# Patient Record
Sex: FEMALE | Race: WHITE | NOT HISPANIC OR LATINO | Employment: OTHER | URBAN - NONMETROPOLITAN AREA
[De-identification: names, ages, dates, MRNs, and addresses within clinical notes are randomized per-mention and may not be internally consistent; named-entity substitution may affect disease eponyms.]

---

## 2017-04-05 ENCOUNTER — TRANSFERRED RECORDS (OUTPATIENT)
Dept: HEALTH INFORMATION MANAGEMENT | Facility: HOSPITAL | Age: 72
End: 2017-04-05

## 2017-04-05 DIAGNOSIS — Z78.0 POSTMENOPAUSAL STATUS: Primary | ICD-10-CM

## 2017-04-19 ENCOUNTER — HOSPITAL ENCOUNTER (OUTPATIENT)
Dept: GENERAL RADIOLOGY | Facility: HOSPITAL | Age: 72
Discharge: HOME | End: 2017-04-19
Attending: FAMILY MEDICINE | Admitting: FAMILY MEDICINE
Payer: COMMERCIAL

## 2017-04-19 PROCEDURE — 77080 DXA BONE DENSITY AXIAL: CPT | Mod: TC

## 2020-06-25 ENCOUNTER — MEDICAL CORRESPONDENCE (OUTPATIENT)
Dept: HEALTH INFORMATION MANAGEMENT | Facility: HOSPITAL | Age: 75
End: 2020-06-25

## 2020-06-27 ENCOUNTER — HOSPITAL ENCOUNTER (EMERGENCY)
Facility: HOSPITAL | Age: 75
Discharge: HOME | End: 2020-06-27
Attending: EMERGENCY MEDICINE | Admitting: EMERGENCY MEDICINE
Payer: COMMERCIAL

## 2020-06-27 ENCOUNTER — APPOINTMENT (OUTPATIENT)
Dept: GENERAL RADIOLOGY | Facility: HOSPITAL | Age: 75
End: 2020-06-27
Attending: EMERGENCY MEDICINE
Payer: COMMERCIAL

## 2020-06-27 VITALS
SYSTOLIC BLOOD PRESSURE: 155 MMHG | RESPIRATION RATE: 16 BRPM | OXYGEN SATURATION: 95 % | DIASTOLIC BLOOD PRESSURE: 95 MMHG | TEMPERATURE: 99.3 F | HEART RATE: 77 BPM

## 2020-06-27 DIAGNOSIS — N39.0 URINARY TRACT INFECTION WITHOUT HEMATURIA, SITE UNSPECIFIED: ICD-10-CM

## 2020-06-27 DIAGNOSIS — R50.9 FEVER, UNSPECIFIED FEVER CAUSE: ICD-10-CM

## 2020-06-27 LAB
ALBUMIN UR-MCNC: NEGATIVE MG/DL
ANION GAP SERPL CALCULATED.3IONS-SCNC: 5 MMOL/L (ref 3–14)
APPEARANCE UR: CLEAR
BACTERIA #/AREA URNS HPF: ABNORMAL /HPF
BASOPHILS # BLD AUTO: 0.1 10E9/L (ref 0–0.2)
BASOPHILS NFR BLD AUTO: 1.1 %
BILIRUB UR QL STRIP: NEGATIVE
BUN SERPL-MCNC: 11 MG/DL (ref 7–30)
CALCIUM SERPL-MCNC: 9.1 MG/DL (ref 8.5–10.1)
CHLORIDE SERPL-SCNC: 102 MMOL/L (ref 94–109)
CO2 SERPL-SCNC: 26 MMOL/L (ref 20–32)
COLOR UR AUTO: ABNORMAL
CREAT SERPL-MCNC: 0.76 MG/DL (ref 0.52–1.04)
D DIMER PPP FEU-MCNC: <0.3 UG/ML FEU (ref 0–0.5)
DIFFERENTIAL METHOD BLD: NORMAL
EOSINOPHIL # BLD AUTO: 0 10E9/L (ref 0–0.7)
EOSINOPHIL NFR BLD AUTO: 0 %
ERYTHROCYTE [DISTWIDTH] IN BLOOD BY AUTOMATED COUNT: 12.5 % (ref 10–15)
ERYTHROCYTE [SEDIMENTATION RATE] IN BLOOD BY WESTERGREN METHOD: 9 MM/H (ref 0–30)
FLUAV+FLUBV RNA SPEC QL NAA+PROBE: NEGATIVE
FLUAV+FLUBV RNA SPEC QL NAA+PROBE: NEGATIVE
GFR SERPL CREATININE-BSD FRML MDRD: 76 ML/MIN/{1.73_M2}
GLUCOSE SERPL-MCNC: 100 MG/DL (ref 70–99)
GLUCOSE UR STRIP-MCNC: NEGATIVE MG/DL
HCT VFR BLD AUTO: 37.8 % (ref 35–47)
HGB BLD-MCNC: 13 G/DL (ref 11.7–15.7)
HGB UR QL STRIP: NEGATIVE
IMM GRANULOCYTES # BLD: 0 10E9/L (ref 0–0.4)
IMM GRANULOCYTES NFR BLD: 0.5 %
INR PPP: 1.02 (ref 0.86–1.14)
KETONES UR STRIP-MCNC: NEGATIVE MG/DL
LEUKOCYTE ESTERASE UR QL STRIP: ABNORMAL
LYMPHOCYTES # BLD AUTO: 0.9 10E9/L (ref 0.8–5.3)
LYMPHOCYTES NFR BLD AUTO: 14.1 %
MCH RBC QN AUTO: 31.8 PG (ref 26.5–33)
MCHC RBC AUTO-ENTMCNC: 34.4 G/DL (ref 31.5–36.5)
MCV RBC AUTO: 92 FL (ref 78–100)
MONOCYTES # BLD AUTO: 0.6 10E9/L (ref 0–1.3)
MONOCYTES NFR BLD AUTO: 10.5 %
NEUTROPHILS # BLD AUTO: 4.5 10E9/L (ref 1.6–8.3)
NEUTROPHILS NFR BLD AUTO: 73.8 %
NITRATE UR QL: NEGATIVE
NRBC # BLD AUTO: 0 10*3/UL
NRBC BLD AUTO-RTO: 0 /100
PH UR STRIP: 7.5 PH (ref 4.7–8)
PLATELET # BLD AUTO: 176 10E9/L (ref 150–450)
POTASSIUM SERPL-SCNC: 3.9 MMOL/L (ref 3.4–5.3)
RBC # BLD AUTO: 4.09 10E12/L (ref 3.8–5.2)
RBC #/AREA URNS AUTO: 2 /HPF (ref 0–2)
RSV RNA SPEC NAA+PROBE: NEGATIVE
SODIUM SERPL-SCNC: 133 MMOL/L (ref 133–144)
SOURCE: ABNORMAL
SP GR UR STRIP: 1.01 (ref 1–1.03)
SPECIMEN SOURCE: NORMAL
SQUAMOUS #/AREA URNS AUTO: 1 /HPF (ref 0–1)
TROPONIN I SERPL-MCNC: <0.015 UG/L (ref 0–0.04)
UROBILINOGEN UR STRIP-MCNC: NORMAL MG/DL (ref 0–2)
WBC # BLD AUTO: 6.1 10E9/L (ref 4–11)
WBC #/AREA URNS AUTO: 8 /HPF (ref 0–5)

## 2020-06-27 PROCEDURE — 85379 FIBRIN DEGRADATION QUANT: CPT | Performed by: EMERGENCY MEDICINE

## 2020-06-27 PROCEDURE — U0003 INFECTIOUS AGENT DETECTION BY NUCLEIC ACID (DNA OR RNA); SEVERE ACUTE RESPIRATORY SYNDROME CORONAVIRUS 2 (SARS-COV-2) (CORONAVIRUS DISEASE [COVID-19]), AMPLIFIED PROBE TECHNIQUE, MAKING USE OF HIGH THROUGHPUT TECHNOLOGIES AS DESCRIBED BY CMS-2020-01-R: HCPCS | Performed by: EMERGENCY MEDICINE

## 2020-06-27 PROCEDURE — 71045 X-RAY EXAM CHEST 1 VIEW: CPT | Mod: TC

## 2020-06-27 PROCEDURE — 84484 ASSAY OF TROPONIN QUANT: CPT | Performed by: EMERGENCY MEDICINE

## 2020-06-27 PROCEDURE — 36415 COLL VENOUS BLD VENIPUNCTURE: CPT | Performed by: EMERGENCY MEDICINE

## 2020-06-27 PROCEDURE — 25000132 ZZH RX MED GY IP 250 OP 250 PS 637: Performed by: EMERGENCY MEDICINE

## 2020-06-27 PROCEDURE — 86618 LYME DISEASE ANTIBODY: CPT | Performed by: EMERGENCY MEDICINE

## 2020-06-27 PROCEDURE — 86617 LYME DISEASE ANTIBODY: CPT | Performed by: EMERGENCY MEDICINE

## 2020-06-27 PROCEDURE — 87086 URINE CULTURE/COLONY COUNT: CPT | Performed by: EMERGENCY MEDICINE

## 2020-06-27 PROCEDURE — 86617 LYME DISEASE ANTIBODY: CPT | Mod: 59 | Performed by: EMERGENCY MEDICINE

## 2020-06-27 PROCEDURE — 85610 PROTHROMBIN TIME: CPT

## 2020-06-27 PROCEDURE — C9803 HOPD COVID-19 SPEC COLLECT: HCPCS

## 2020-06-27 PROCEDURE — 87040 BLOOD CULTURE FOR BACTERIA: CPT | Performed by: EMERGENCY MEDICINE

## 2020-06-27 PROCEDURE — 85025 COMPLETE CBC W/AUTO DIFF WBC: CPT | Performed by: EMERGENCY MEDICINE

## 2020-06-27 PROCEDURE — 81001 URINALYSIS AUTO W/SCOPE: CPT | Performed by: EMERGENCY MEDICINE

## 2020-06-27 PROCEDURE — 99284 EMERGENCY DEPT VISIT MOD MDM: CPT | Mod: Z6 | Performed by: EMERGENCY MEDICINE

## 2020-06-27 PROCEDURE — 99284 EMERGENCY DEPT VISIT MOD MDM: CPT | Mod: 25

## 2020-06-27 PROCEDURE — 85652 RBC SED RATE AUTOMATED: CPT | Performed by: EMERGENCY MEDICINE

## 2020-06-27 PROCEDURE — 87631 RESP VIRUS 3-5 TARGETS: CPT | Performed by: EMERGENCY MEDICINE

## 2020-06-27 PROCEDURE — 80048 BASIC METABOLIC PNL TOTAL CA: CPT | Performed by: EMERGENCY MEDICINE

## 2020-06-27 RX ORDER — DOXYCYCLINE 100 MG/1
100 TABLET ORAL DAILY
Qty: 10 TABLET | Refills: 0 | Status: SHIPPED | OUTPATIENT
Start: 2020-06-27 | End: 2020-07-07

## 2020-06-27 RX ORDER — ACETAMINOPHEN 325 MG/1
650 TABLET ORAL ONCE
Status: COMPLETED | OUTPATIENT
Start: 2020-06-27 | End: 2020-06-27

## 2020-06-27 RX ADMIN — ACETAMINOPHEN 650 MG: 325 TABLET, FILM COATED ORAL at 15:21

## 2020-06-27 NOTE — ED AVS SNAPSHOT
[facility]  [address]  [place] [address]  Phone:  [phone]                                    Lorraine Arredondo   MRN: [id number]    Department:  [facility]   Date of Visit:  6/27/2020           After Visit Summary Signature Page    I have received my discharge instructions, and my questions have been answered. I have discussed any challenges I see with this plan with the nurse or doctor.    ..........................................................................................................................................  Patient/Patient Representative Signature      ..........................................................................................................................................  Patient Representative Print Name and Relationship to Patient    ..................................................               ................................................  Date                                   Time    ..........................................................................................................................................  Reviewed by Signature/Title    ...................................................              ..............................................  Date                                               Time          22EPIC Rev 08/18

## 2020-06-27 NOTE — ED PROVIDER NOTES
History     Chief Complaint   Patient presents with     Fever     HPI  Lorraine Arredondo is a 75 year old female who presents today with complaints of a fever.  Symptoms began yesterday.  Symptoms were sudden onset.  Denies any chills.  Patient complained of myalgias.  Has been at baseline state of health.  No additional complaints.  Recent history of tick bite.    Allergies:  Allergies   Allergen Reactions     Cats Cough     Sneezing     Fosamax [Alendronic Acid] GI Disturbance     Gluten Meal Diarrhea and GI Disturbance     Lactose GI Disturbance     Nickel Itching     No Clinical Screening - See Comments GI Disturbance     Barley, oats     Wheat Extract GI Disturbance       Problem List:    There are no active problems to display for this patient.       Past Medical History:    No past medical history on file.    Past Surgical History:    No past surgical history on file.    Family History:    No family history on file.    Social History:  Marital Status:   [2]  Social History     Tobacco Use     Smoking status: Not on file   Substance Use Topics     Alcohol use: Not on file     Drug use: Not on file        Medications:    No current outpatient medications on file.        Review of Systems   Constitutional: Positive for fever. Negative for chills.   HENT: Negative.    Eyes: Negative.  Negative for photophobia.   Respiratory: Negative.  Negative for shortness of breath.    Cardiovascular: Negative.    Gastrointestinal: Negative.    Endocrine: Negative.    Genitourinary: Negative.  Negative for urgency.   Musculoskeletal: Negative.    Skin: Negative.    Allergic/Immunologic: Negative.    Neurological: Negative.    Hematological: Negative.    Psychiatric/Behavioral: Negative.  Negative for self-injury, sleep disturbance and suicidal ideas. The patient is not nervous/anxious.        Physical Exam   BP: 161/89  Pulse: 83  Temp: (!) 102.1  F (38.9  C)(last ibuprofen was last night.)  Resp: 20  SpO2: 98  %      Physical Exam  Constitutional:       General: She is not in acute distress.     Appearance: Normal appearance. She is normal weight. She is not toxic-appearing.   HENT:      Head: Normocephalic and atraumatic.   Eyes:      Extraocular Movements: Extraocular movements intact.      Conjunctiva/sclera: Conjunctivae normal.   Neck:      Musculoskeletal: Normal range of motion and neck supple. No neck rigidity.   Cardiovascular:      Rate and Rhythm: Normal rate and regular rhythm.      Pulses: Normal pulses.   Pulmonary:      Effort: Pulmonary effort is normal.   Abdominal:      General: Abdomen is flat.   Musculoskeletal: Normal range of motion.   Lymphadenopathy:      Cervical: No cervical adenopathy.   Skin:     General: Skin is warm.      Capillary Refill: Capillary refill takes less than 2 seconds.   Neurological:      General: No focal deficit present.      Mental Status: She is alert and oriented to person, place, and time.   Psychiatric:         Mood and Affect: Mood normal.         Behavior: Behavior normal.         Thought Content: Thought content normal.         Judgment: Judgment normal.         ED Course        Procedures      Results for orders placed or performed during the hospital encounter of 06/27/20 (from the past 24 hour(s))   Basic metabolic panel   Result Value Ref Range    Sodium 133 133 - 144 mmol/L    Potassium 3.9 3.4 - 5.3 mmol/L    Chloride 102 94 - 109 mmol/L    Carbon Dioxide 26 20 - 32 mmol/L    Anion Gap 5 3 - 14 mmol/L    Glucose 100 (H) 70 - 99 mg/dL    Urea Nitrogen 11 7 - 30 mg/dL    Creatinine 0.76 0.52 - 1.04 mg/dL    GFR Estimate 76 >60 mL/min/[1.73_m2]    GFR Estimate If Black 89 >60 mL/min/[1.73_m2]    Calcium 9.1 8.5 - 10.1 mg/dL   CBC with platelets differential   Result Value Ref Range    WBC 6.1 4.0 - 11.0 10e9/L    RBC Count 4.09 3.8 - 5.2 10e12/L    Hemoglobin 13.0 11.7 - 15.7 g/dL    Hematocrit 37.8 35.0 - 47.0 %    MCV 92 78 - 100 fl    MCH 31.8 26.5 - 33.0 pg     MCHC 34.4 31.5 - 36.5 g/dL    RDW 12.5 10.0 - 15.0 %    Platelet Count 176 150 - 450 10e9/L    Diff Method Automated Method     % Neutrophils 73.8 %    % Lymphocytes 14.1 %    % Monocytes 10.5 %    % Eosinophils 0.0 %    % Basophils 1.1 %    % Immature Granulocytes 0.5 %    Nucleated RBCs 0 0 /100    Absolute Neutrophil 4.5 1.6 - 8.3 10e9/L    Absolute Lymphocytes 0.9 0.8 - 5.3 10e9/L    Absolute Monocytes 0.6 0.0 - 1.3 10e9/L    Absolute Eosinophils 0.0 0.0 - 0.7 10e9/L    Absolute Basophils 0.1 0.0 - 0.2 10e9/L    Abs Immature Granulocytes 0.0 0 - 0.4 10e9/L    Absolute Nucleated RBC 0.0    D dimer quantitative   Result Value Ref Range    D Dimer <0.3 0.0 - 0.50 ug/ml FEU   INR   Result Value Ref Range    INR 1.02 0.86 - 1.14   Troponin I   Result Value Ref Range    Troponin I ES <0.015 0.000 - 0.045 ug/L   Erythrocyte sedimentation rate auto   Result Value Ref Range    Sed Rate 9 0 - 30 mm/h   XR Chest Port 1 View    Narrative    PROCEDURE:  XR CHEST PORT 1 VW    HISTORY: Patient with complaints of fever myalgias rule out pneumonia.  .    COMPARISON:  None.    FINDINGS:    The cardiomediastinal contours are stable. There is calcific aortic  atherosclerosis.   No focal consolidation, effusion or pneumothorax.      Impression    IMPRESSION:  No focal consolidation.      JESUSITA NATION MD   UA with Microscopic   Result Value Ref Range    Color Urine Light Yellow     Appearance Urine Clear     Glucose Urine Negative NEG^Negative mg/dL    Bilirubin Urine Negative NEG^Negative    Ketones Urine Negative NEG^Negative mg/dL    Specific Gravity Urine 1.008 1.003 - 1.035    Blood Urine Negative NEG^Negative    pH Urine 7.5 4.7 - 8.0 pH    Protein Albumin Urine Negative NEG^Negative mg/dL    Urobilinogen mg/dL Normal 0.0 - 2.0 mg/dL    Nitrite Urine Negative NEG^Negative    Leukocyte Esterase Urine Large (A) NEG^Negative    Source Midstream Urine     WBC Urine 8 (H) 0 - 5 /HPF    RBC Urine 2 0 - 2 /HPF    Bacteria Urine  None (A) NEG^Negative /HPF    Squamous Epithelial /HPF Urine 1 0 - 1 /HPF       Medications   acetaminophen (TYLENOL) tablet 650 mg (has no administration in time range)       Assessments & Plan (with Medical Decision Making)     Well appearing 75-year-old female who presents today with complaints of a fever.  Patient denies any URI symptoms.  Patient also complained of myalgias.  No recent travel outside the country.  Patient reports she had a tick bite recently that she removed.  Patient unsure how long the tick had been present.    Physical exam unremarkable.  Labs as noted as above with suggestion of a possible urinary tract infection.  Influenza pending at this time.  Patient not willing to wait any further for results of tests including call with test.  Patient is going to be discharged home and states she will call back for her influenza test results.     Given recent tick bite, Lyme antibody test ordered.  Patient will be treated with doxycycline x 10 days for possible UTI and early stage of Lyme's.  Patient understands to return if she develops any new or worsening symptoms. Blood cx pending.     Due to the nature of this electronic medical record, laboratory results, imaging results, diagnosis, other information and medications reported above may not represent information available to me at the the time of my care and disposition. Medications reported above may have not been ordered by me.     Portions of the record may have been created with voice recognition software. Occasional wrong-word or 'sound-a- like' substitution may have occurred due to the inherent limitations of voice recognition software. Though the chart has been reviewed, there may be inadvertent transcription errors. Read the chart carefully and recognize, using context, where substitutions have occurred.       New Prescriptions    No medications on file       Final diagnoses:   Fever, unspecified fever cause   Urinary tract infection  without hematuria, site unspecified       6/27/2020   [facility]     Ham Joe MD  06/27/20 8022       Ham Joe MD  06/30/20 8673

## 2020-06-27 NOTE — DISCHARGE INSTRUCTIONS
1) Follow the aftercare instructions provided  2) You must follow up with your doctor on Monday  3) You have been given a prescription for a medication that can cause an allergic reactions. Return to the ER if you develop any itching, tongue swelling, wheezing or shortness of breath.

## 2020-06-27 NOTE — LETTER
June 30, 2020        Lorraine [place]redondo  [address]   [facility] [place] [address]    This letter provides a written record that you were tested for COVID-19 on 06/27/20.       Your result was negative. This means that we didn t find the virus that causes COVID-19 in your sample. A test may show negative when you do actually have the virus. This can happen when the virus is in the early stages of infection, before you feel illness symptoms.    If you have symptoms   Stay home and away from others (self-isolate) until you meet ALL of the guidelines below:    You ve had no fever--and no medicine that reduces fever--for 3 full days (72 hours). And      Your other symptoms have gotten better. For example, your cough or breathing has improved. And     At least 10 days have passed since your symptoms started.    During this time:    Stay home. Don t go to work, school or anywhere else.     Stay in your own room, including for meals. Use your own bathroom if you can.    Stay away from others in your home. No hugging, kissing or shaking hands. No visitors.    Clean  high touch  surfaces often (doorknobs, counters, handles, etc.). Use a household cleaning spray or wipes. You can find a full list on the EPA website at www.epa.gov/pesticide-registration/list-n-disinfectants-use-against-sars-cov-2.    Cover your mouth and nose with a mask, tissue or washcloth to avoid spreading germs.    Wash your hands and face often with soap and water.    Going back to work  Check with your employer for any guidelines to follow for going back to work.    Employers: This document serves as formal notice that your employee tested negative for COVID-19, as of the testing date shown above.

## 2020-06-27 NOTE — ED TRIAGE NOTES
Pt in for evaluation of fever onset yesterday. Pt reports she was feeling chilled last night and checked and had a temp of 101. States this am upon awakening she had temp of 98 and over the day she has noticed the chills returning and just pta temp 101.3 again. Pt denies n,v,d, cough or sob. Reports she did have a small tick bite to the back of her right arm about 10 days ago.

## 2020-06-29 LAB
BACTERIA SPEC CULT: NORMAL
SARS-COV-2 RNA SPEC QL NAA+PROBE: NOT DETECTED
SPECIMEN SOURCE: NORMAL
SPECIMEN SOURCE: NORMAL

## 2020-06-30 LAB — B BURGDOR IGG+IGM SER QL: 0.1 (ref 0–0.89)

## 2020-06-30 ASSESSMENT — ENCOUNTER SYMPTOMS
RESPIRATORY NEGATIVE: 1
CARDIOVASCULAR NEGATIVE: 1
SHORTNESS OF BREATH: 0
PHOTOPHOBIA: 0
NERVOUS/ANXIOUS: 0
SLEEP DISTURBANCE: 0
ALLERGIC/IMMUNOLOGIC NEGATIVE: 1
GASTROINTESTINAL NEGATIVE: 1
CHILLS: 0
FEVER: 1
NEUROLOGICAL NEGATIVE: 1
PSYCHIATRIC NEGATIVE: 1
MUSCULOSKELETAL NEGATIVE: 1
HEMATOLOGIC/LYMPHATIC NEGATIVE: 1
ENDOCRINE NEGATIVE: 1
EYES NEGATIVE: 1

## 2020-07-01 LAB
B BURGDOR IGG SER QL IB: NEGATIVE
B BURGDOR IGM SER QL IB: NEGATIVE

## 2020-07-03 LAB
BACTERIA SPEC CULT: NORMAL
SPECIMEN SOURCE: NORMAL

## 2020-08-11 ENCOUNTER — HOSPITAL ENCOUNTER (OUTPATIENT)
Dept: BONE DENSITY | Facility: HOSPITAL | Age: 75
Discharge: HOME | End: 2020-08-11
Attending: FAMILY MEDICINE | Admitting: FAMILY MEDICINE
Payer: COMMERCIAL

## 2020-08-11 DIAGNOSIS — M81.0 AGE-RELATED OSTEOPOROSIS WITHOUT CURRENT PATHOLOGICAL FRACTURE: ICD-10-CM

## 2020-08-11 DIAGNOSIS — E28.39 ESTROGEN DEFICIENCY: ICD-10-CM

## 2020-08-11 DIAGNOSIS — Z78.0 POSTMENOPAUSAL: ICD-10-CM

## 2020-08-11 PROCEDURE — 77080 DXA BONE DENSITY AXIAL: CPT | Mod: TC

## 2021-07-01 ENCOUNTER — TRANSFERRED RECORDS (OUTPATIENT)
Dept: HEALTH INFORMATION MANAGEMENT | Facility: CLINIC | Age: 76
End: 2021-07-01

## 2021-07-04 LAB
CREATININE (EXTERNAL): 0.82 MG/DL (ref 0.4–1)
GFR ESTIMATED (EXTERNAL): >60 ML/MIN/1.73M2
GLUCOSE (EXTERNAL): 100 MG/DL (ref 70–99)
POTASSIUM (EXTERNAL): 4.2 MEQ/L (ref 3.4–5.1)

## 2021-07-05 ENCOUNTER — APPOINTMENT (OUTPATIENT)
Dept: GENERAL RADIOLOGY | Facility: HOSPITAL | Age: 76
End: 2021-07-05
Attending: EMERGENCY MEDICINE
Payer: COMMERCIAL

## 2021-07-05 ENCOUNTER — HOSPITAL ENCOUNTER (EMERGENCY)
Facility: HOSPITAL | Age: 76
Discharge: ANOTHER HOSPITAL | End: 2021-07-05
Attending: EMERGENCY MEDICINE | Admitting: EMERGENCY MEDICINE
Payer: COMMERCIAL

## 2021-07-05 ENCOUNTER — TRANSFERRED RECORDS (OUTPATIENT)
Dept: HEALTH INFORMATION MANAGEMENT | Facility: CLINIC | Age: 76
End: 2021-07-05

## 2021-07-05 VITALS
DIASTOLIC BLOOD PRESSURE: 74 MMHG | SYSTOLIC BLOOD PRESSURE: 116 MMHG | OXYGEN SATURATION: 96 % | RESPIRATION RATE: 13 BRPM | HEART RATE: 69 BPM | TEMPERATURE: 98.5 F | WEIGHT: 137 LBS

## 2021-07-05 DIAGNOSIS — I21.4 NSTEMI (NON-ST ELEVATED MYOCARDIAL INFARCTION) (H): ICD-10-CM

## 2021-07-05 LAB
ALBUMIN SERPL-MCNC: 3.8 G/DL (ref 3.4–5)
ALP SERPL-CCNC: 57 U/L (ref 40–150)
ALT SERPL W P-5'-P-CCNC: 24 U/L (ref 0–50)
ANION GAP SERPL CALCULATED.3IONS-SCNC: 5 MMOL/L (ref 3–14)
AST SERPL W P-5'-P-CCNC: 22 U/L (ref 0–45)
BASOPHILS # BLD AUTO: 0.1 10E9/L (ref 0–0.2)
BASOPHILS NFR BLD AUTO: 0.9 %
BILIRUB SERPL-MCNC: 0.9 MG/DL (ref 0.2–1.3)
BUN SERPL-MCNC: 11 MG/DL (ref 7–30)
CALCIUM SERPL-MCNC: 9.1 MG/DL (ref 8.5–10.1)
CHLORIDE SERPL-SCNC: 105 MMOL/L (ref 94–109)
CO2 SERPL-SCNC: 28 MMOL/L (ref 20–32)
CREAT SERPL-MCNC: 0.78 MG/DL (ref 0.52–1.04)
DIFFERENTIAL METHOD BLD: NORMAL
EOSINOPHIL # BLD AUTO: 0 10E9/L (ref 0–0.7)
EOSINOPHIL NFR BLD AUTO: 0.4 %
ERYTHROCYTE [DISTWIDTH] IN BLOOD BY AUTOMATED COUNT: 12.7 % (ref 10–15)
GFR SERPL CREATININE-BSD FRML MDRD: 74 ML/MIN/{1.73_M2}
GLUCOSE SERPL-MCNC: 105 MG/DL (ref 70–99)
HCT VFR BLD AUTO: 37.5 % (ref 35–47)
HGB BLD-MCNC: 12.9 G/DL (ref 11.7–15.7)
IMM GRANULOCYTES # BLD: 0 10E9/L (ref 0–0.4)
IMM GRANULOCYTES NFR BLD: 0.3 %
INR PPP: 1.04 (ref 0.86–1.14)
LABORATORY COMMENT REPORT: NORMAL
LYMPHOCYTES # BLD AUTO: 1.2 10E9/L (ref 0.8–5.3)
LYMPHOCYTES NFR BLD AUTO: 17.3 %
MCH RBC QN AUTO: 32.3 PG (ref 26.5–33)
MCHC RBC AUTO-ENTMCNC: 34.4 G/DL (ref 31.5–36.5)
MCV RBC AUTO: 94 FL (ref 78–100)
MONOCYTES # BLD AUTO: 0.6 10E9/L (ref 0–1.3)
MONOCYTES NFR BLD AUTO: 8.6 %
NEUTROPHILS # BLD AUTO: 5 10E9/L (ref 1.6–8.3)
NEUTROPHILS NFR BLD AUTO: 72.5 %
NRBC # BLD AUTO: 0 10*3/UL
NRBC BLD AUTO-RTO: 0 /100
NT-PROBNP SERPL-MCNC: 1015 PG/ML (ref 0–1800)
PLATELET # BLD AUTO: 238 10E9/L (ref 150–450)
POTASSIUM SERPL-SCNC: 3.9 MMOL/L (ref 3.4–5.3)
PROT SERPL-MCNC: 7.5 G/DL (ref 6.8–8.8)
RBC # BLD AUTO: 4 10E12/L (ref 3.8–5.2)
SARS-COV-2 RNA RESP QL NAA+PROBE: NEGATIVE
SODIUM SERPL-SCNC: 138 MMOL/L (ref 133–144)
SPECIMEN SOURCE: NORMAL
TROPONIN I SERPL-MCNC: 1.02 UG/L (ref 0–0.04)
WBC # BLD AUTO: 6.9 10E9/L (ref 4–11)

## 2021-07-05 PROCEDURE — U0005 INFEC AGEN DETEC AMPLI PROBE: HCPCS | Performed by: EMERGENCY MEDICINE

## 2021-07-05 PROCEDURE — 36415 COLL VENOUS BLD VENIPUNCTURE: CPT

## 2021-07-05 PROCEDURE — 96376 TX/PRO/DX INJ SAME DRUG ADON: CPT

## 2021-07-05 PROCEDURE — 85025 COMPLETE CBC W/AUTO DIFF WBC: CPT | Performed by: EMERGENCY MEDICINE

## 2021-07-05 PROCEDURE — 71045 X-RAY EXAM CHEST 1 VIEW: CPT

## 2021-07-05 PROCEDURE — 93005 ELECTROCARDIOGRAM TRACING: CPT

## 2021-07-05 PROCEDURE — 99291 CRITICAL CARE FIRST HOUR: CPT | Performed by: EMERGENCY MEDICINE

## 2021-07-05 PROCEDURE — C9803 HOPD COVID-19 SPEC COLLECT: HCPCS

## 2021-07-05 PROCEDURE — 85610 PROTHROMBIN TIME: CPT | Performed by: EMERGENCY MEDICINE

## 2021-07-05 PROCEDURE — 84484 ASSAY OF TROPONIN QUANT: CPT | Performed by: EMERGENCY MEDICINE

## 2021-07-05 PROCEDURE — U0003 INFECTIOUS AGENT DETECTION BY NUCLEIC ACID (DNA OR RNA); SEVERE ACUTE RESPIRATORY SYNDROME CORONAVIRUS 2 (SARS-COV-2) (CORONAVIRUS DISEASE [COVID-19]), AMPLIFIED PROBE TECHNIQUE, MAKING USE OF HIGH THROUGHPUT TECHNOLOGIES AS DESCRIBED BY CMS-2020-01-R: HCPCS | Performed by: EMERGENCY MEDICINE

## 2021-07-05 PROCEDURE — 83880 ASSAY OF NATRIURETIC PEPTIDE: CPT | Mod: GZ | Performed by: EMERGENCY MEDICINE

## 2021-07-05 PROCEDURE — 96368 THER/DIAG CONCURRENT INF: CPT

## 2021-07-05 PROCEDURE — 93010 ELECTROCARDIOGRAM REPORT: CPT | Performed by: INTERNAL MEDICINE

## 2021-07-05 PROCEDURE — 99285 EMERGENCY DEPT VISIT HI MDM: CPT | Mod: 25

## 2021-07-05 PROCEDURE — 96365 THER/PROPH/DIAG IV INF INIT: CPT

## 2021-07-05 PROCEDURE — 80053 COMPREHEN METABOLIC PANEL: CPT | Performed by: EMERGENCY MEDICINE

## 2021-07-05 PROCEDURE — 250N000011 HC RX IP 250 OP 636: Performed by: EMERGENCY MEDICINE

## 2021-07-05 RX ORDER — ATORVASTATIN CALCIUM 40 MG/1
40 TABLET, FILM COATED ORAL
COMMUNITY
Start: 2021-08-03

## 2021-07-05 RX ORDER — CLOPIDOGREL BISULFATE 75 MG/1
75 TABLET ORAL
COMMUNITY
Start: 2021-08-03

## 2021-07-05 RX ORDER — HEPARIN SODIUM 10000 [USP'U]/100ML
0-5000 INJECTION, SOLUTION INTRAVENOUS CONTINUOUS
Status: DISCONTINUED | OUTPATIENT
Start: 2021-07-05 | End: 2021-07-05 | Stop reason: HOSPADM

## 2021-07-05 RX ORDER — CLOPIDOGREL BISULFATE 75 MG/1
75 TABLET ORAL DAILY
COMMUNITY

## 2021-07-05 RX ORDER — NITROGLYCERIN 20 MG/100ML
10-200 INJECTION INTRAVENOUS CONTINUOUS
Status: DISCONTINUED | OUTPATIENT
Start: 2021-07-05 | End: 2021-07-05 | Stop reason: HOSPADM

## 2021-07-05 RX ORDER — METOPROLOL TARTRATE 25 MG/1
12.5 TABLET, FILM COATED ORAL
COMMUNITY
Start: 2021-08-03

## 2021-07-05 RX ORDER — NITROGLYCERIN 0.4 MG/1
0.4 TABLET SUBLINGUAL EVERY 5 MIN PRN
COMMUNITY

## 2021-07-05 RX ADMIN — HEPARIN SODIUM 750 UNITS/HR: 10000 INJECTION, SOLUTION INTRAVENOUS at 12:13

## 2021-07-05 RX ADMIN — NITROGLYCERIN 10 MCG/MIN: 20 INJECTION INTRAVENOUS at 12:14

## 2021-07-05 ASSESSMENT — ENCOUNTER SYMPTOMS
EYES NEGATIVE: 1
CONSTITUTIONAL NEGATIVE: 1
NEUROLOGICAL NEGATIVE: 1
MUSCULOSKELETAL NEGATIVE: 1
GASTROINTESTINAL NEGATIVE: 1
RESPIRATORY NEGATIVE: 1

## 2021-07-05 NOTE — ED NOTES
[facility] have beds and paging hospitalist for Dr. Serrano

## 2021-07-05 NOTE — ED PROVIDER NOTES
"  History     Chief Complaint   Patient presents with     Chest Pain     c/o chest heaviness since this am. notes stent placed on friday. denies shortness of breath.      HPI  Lorraine Arredondo is a 76 year old female who presents to the emergency department complaining of pressure in her upper chest which radiates into her neck.  The patient relates that she had a coronary artery stent placed at [facility] earlier this month.  She states that they placed the stent \"in the  maker\".  The patient did well postoperatively and was discharged.  She states today she developed chest discomfort similar to what she had experienced previously.  She states she has \"pressure\" in her upper chest radiating to her neck.  She denies headache or dizziness.  She states she has not been diaphoretic.  She denies feeling short of breath.  She is not nauseous and she has not vomited.  She has had no change in her bowel or bladder habits.    Allergies:  Allergies   Allergen Reactions     Cats Cough     Sneezing     Fosamax [Alendronic Acid] GI Disturbance     Gluten Meal Diarrhea and GI Disturbance     Lactose GI Disturbance     Nickel Itching     No Clinical Screening - See Comments GI Disturbance     Barley, oats     Wheat Extract GI Disturbance       Problem List:    There are no active problems to display for this patient.       Past Medical History:    No past medical history on file.    Past Surgical History:    No past surgical history on file.    Family History:    No family history on file.    Social History:  Marital Status:   [2]  Social History     Tobacco Use     Smoking status: Not on file   Substance Use Topics     Alcohol use: Not on file     Drug use: Not on file        Medications:    aspirin (ASA) 81 MG EC tablet  [START ON 8/3/2021] atorvastatin (LIPITOR) 40 MG tablet  clopidogrel (PLAVIX) 75 MG tablet  [START ON 8/3/2021] clopidogrel (PLAVIX) 75 MG tablet  FP GLUCOSAMINE SULFATE OR  [START ON " 8/3/2021] metoprolol tartrate (LOPRESSOR) 25 MG tablet  nitroGLYcerin (NITROSTAT) 0.4 MG sublingual tablet          Review of Systems   Constitutional: Negative.    HENT: Negative.    Eyes: Negative.    Respiratory: Negative.    Cardiovascular: Positive for chest pain.   Gastrointestinal: Negative.    Genitourinary: Negative.    Musculoskeletal: Negative.    Neurological: Negative.    Please see history of chief complaint.  All other systems reviewed and they are found unremarkable.    Physical Exam   BP: 125/75  Pulse: 71  Temp: 98  F (36.7  C)  Resp: 16  Weight: 62.1 kg (137 lb)  SpO2: 96 %      Physical Exam this is a 76-year-old female who is awake alert oriented person place and time.  She is very pleasant and cooperative with my exam.  HEENT normocephalic atraumatic extraocular muscles intact pupils equally round and active to light and oropharynx is clear.  Neck is supple is full range of motion without pain.  There is no JVD.  Lungs are clear bilaterally.  Heart maintains a regular rate and rhythm.  S1 and S2 sounds are appreciated.  There is no murmur.  The abdomen is soft its nontender no mass no organomegaly no rebound no involuntary guarding.  Extremities have full range of motion 5/5 strength no edema neurologic exam no focal deficit dermatologic exam there are no diffuse skin rashes or lesions.    ED Course        Critical care time for this patient is 30 minutes          {EKG done and interpreted by myself.  It shows a sinus rhythm with a ventricular rate of 64 bpm.  The CO interval is 168ms.  The corrected QT is 449 ms.  Patient has diffuse T wave inversion in her anterior and lateral precordial leads.  Patient has a millimeter of ST segment depression in lead III.  She has T wave inversion in lead II.  There is half a millimeter of ST segment elevation in aVL.  After receiving the EKG I was unable to find a previous EKG in the patient's medical records.  I immediately reached out to [facility]  [facility] and discussed the case with Dr. Junior, cardiologist on-call.  She related that these EKG changes were indeed new.  Her recommendation is IV nitro and heparin and transfer the patient to [facility].  I then discussed the case with Dr. Balderas is the on-call hospitalist who graciously agreed to admit the patient to his service with a cardiology consult.  Dr. Sung related that the patient will probably require cardiac catheterization tomorrow.  The patient will be transported by advanced life support on intravenous nitro and heparin in critical but stable condition.         Results for orders placed or performed during the hospital encounter of 07/05/21 (from the past 24 hour(s))   CBC with platelets differential   Result Value Ref Range    WBC 6.9 4.0 - 11.0 10e9/L    RBC Count 4.00 3.8 - 5.2 10e12/L    Hemoglobin 12.9 11.7 - 15.7 g/dL    Hematocrit 37.5 35.0 - 47.0 %    MCV 94 78 - 100 fl    MCH 32.3 26.5 - 33.0 pg    MCHC 34.4 31.5 - 36.5 g/dL    RDW 12.7 10.0 - 15.0 %    Platelet Count 238 150 - 450 10e9/L    Diff Method Automated Method     % Neutrophils 72.5 %    % Lymphocytes 17.3 %    % Monocytes 8.6 %    % Eosinophils 0.4 %    % Basophils 0.9 %    % Immature Granulocytes 0.3 %    Nucleated RBCs 0 0 /100    Absolute Neutrophil 5.0 1.6 - 8.3 10e9/L    Absolute Lymphocytes 1.2 0.8 - 5.3 10e9/L    Absolute Monocytes 0.6 0.0 - 1.3 10e9/L    Absolute Eosinophils 0.0 0.0 - 0.7 10e9/L    Absolute Basophils 0.1 0.0 - 0.2 10e9/L    Abs Immature Granulocytes 0.0 0 - 0.4 10e9/L    Absolute Nucleated RBC 0.0    INR   Result Value Ref Range    INR 1.04 0.86 - 1.14   Comprehensive metabolic panel   Result Value Ref Range    Sodium 138 133 - 144 mmol/L    Potassium 3.9 3.4 - 5.3 mmol/L    Chloride 105 94 - 109 mmol/L    Carbon Dioxide 28 20 - 32 mmol/L    Anion Gap 5 3 - 14 mmol/L    Glucose 105 (H) 70 - 99 mg/dL    Urea Nitrogen 11 7 - 30 mg/dL    Creatinine 0.78 0.52 - 1.04 mg/dL    GFR Estimate 74 >60  mL/min/[1.73_m2]    GFR Estimate If Black 86 >60 mL/min/[1.73_m2]    Calcium 9.1 8.5 - 10.1 mg/dL    Bilirubin Total 0.9 0.2 - 1.3 mg/dL    Albumin 3.8 3.4 - 5.0 g/dL    Protein Total 7.5 6.8 - 8.8 g/dL    Alkaline Phosphatase 57 40 - 150 U/L    ALT 24 0 - 50 U/L    AST 22 0 - 45 U/L   Troponin I   Result Value Ref Range    Troponin I ES 1.019 (HH) 0.000 - 0.045 ug/L   Nt probnp inpatient (BNP)   Result Value Ref Range    N-Terminal Pro BNP Inpatient 1,015 0 - 1,800 pg/mL   XR Chest Port 1 View    Narrative    PROCEDURE:  XR CHEST PORT 1 VIEW    HISTORY: chest pain. .    COMPARISON:  6/27/2020    FINDINGS:    The cardiomediastinal contours are stable. There is calcific aortic  atherosclerosis.   No focal consolidation, effusion or pneumothorax.      Impression    IMPRESSION:  Stable portable chest.      JESUSITA NATION MD          SYSTEM ID:  [id number]       Medications   nitroGLYcerin 50 mg in D5W 250 mL (adult std) infusion CENTRAL (10 mcg/min Intravenous New Bag 7/5/21 1214)   heparin 25,000 units in 0.45% NaCl 250 mL ANTICOAGULANT infusion (750 Units/hr Intravenous New Bag 7/5/21 1213)   heparin loading dose for LOW INTENSITY TREATMENT * Give BEFORE starting heparin infusion (3,750 Units Intravenous Given 7/5/21 1211)       Assessments & Plan (with Medical Decision Making)     I have reviewed the nursing notes.    I have reviewed the findings, diagnosis, plan and need for follow up with the patient.  Plan for this patient is a transfer [facility] life support for a direct admission to telemetry to [facility]    No medications on file       Final diagnoses:   NSTEMI (non-ST elevated myocardial infarction) (H)       7/5/2021   [facility]     Best Serrano, DO  07/05/21 1237       Best Serrano, DO  07/05/21 1411

## 2021-07-05 NOTE — ED NOTES
"Pt presents to ED with c/o chest \"heaviness\" that began this AM.  Pt had stent placed on Friday after a failed stress test. Pt took 3 nitrO prior to arrival with no relief. Pt denies ever having CP.  Denies SOB, n/v/d.  Pt has no other complaints at this time.   "

## 2021-07-07 ENCOUNTER — TELEPHONE (OUTPATIENT)
Dept: CARDIAC REHAB | Facility: HOSPITAL | Age: 76
End: 2021-07-07

## 2021-07-07 NOTE — TELEPHONE ENCOUNTER
Patient called to schedule Phase II Cardiac Rehab. Message was left for her to call [phone].  Cindy Bernal, RT on 7/7/2021 at 12:33 PM

## 2021-07-08 DIAGNOSIS — I21.4 NSTEMI (NON-ST ELEVATED MYOCARDIAL INFARCTION) (H): Primary | ICD-10-CM

## 2021-07-16 ENCOUNTER — HOSPITAL ENCOUNTER (OUTPATIENT)
Dept: CARDIAC REHAB | Facility: HOSPITAL | Age: 76
Setting detail: THERAPIES SERIES
End: 2021-07-16
Attending: INTERNAL MEDICINE
Payer: COMMERCIAL

## 2021-07-16 VITALS — WEIGHT: 139 LBS | HEIGHT: 64 IN | BODY MASS INDEX: 23.73 KG/M2

## 2021-07-16 DIAGNOSIS — I21.4 NSTEMI (NON-ST ELEVATED MYOCARDIAL INFARCTION) (H): ICD-10-CM

## 2021-07-16 PROCEDURE — 93798 PHYS/QHP OP CAR RHAB W/ECG: CPT

## 2021-07-16 PROCEDURE — 999N000109 HC STATISTIC OP CR VISIT

## 2021-07-16 ASSESSMENT — 6 MINUTE WALK TEST (6MWT)
FEMALE CALC: 1416.35
MALE CALC: 1423.12
GENDER SELECTION: FEMALE
TOTAL DISTANCE WALKED (FT): 1225
PREDICTED: 1431.8

## 2021-07-16 ASSESSMENT — MIFFLIN-ST. JEOR: SCORE: 1110.5

## 2021-07-16 NOTE — PROGRESS NOTES
07/16/21 1000   Session   Session Initial Evaluation and Exercise Prescription   Certified through this date 08/14/21   Cardiac Rehab Assessment   Cardiac Rehab Assessment 7/16: Patient attended her initial evaluation for Phase II Cardiac Rehab today. She had a NSTEMI on 7/1 that resulted in a stent of GERRY to LAD. She had a 80% stenosis in the D1 vessel that was not stented. Patient returned to the ED on 7/5, as she said she was not feeling well. Patient states she has not had any chest pain with her event. She had a second angiogram on 7/6 which did not show any changes and did provide her initial stent was patent.   She relays anxiety about what she can and cannot do physically at this time. She is thankful for Cardiac Rehab to be able to have her heart monitored with exercise.   Patient follows a gluten free diet due to Celiac's disease. She has now incorporated heart healthy recommendations to her diet.   Patient appeared to be short of breath with the 6MWT, but rated her SOB on the dyspnea scale as a 3.   Helping patient feel comfortable with exercise will be a focus as she proceeds through Cardiac Rehab.   The patient's history and clinical status including hemodynamics and ECG were evaluated. The patient was assessed to be stable and appropriate to begin exercise.   The patient's functional capacity and exercise prescription were determined by the completion of the 6 minute walk test. See results above. The patient was oriented to the program.  Risk factor profile was completed. Goals and objectives were discussed. CV response was WNL. No symptoms, complaints or pain were reported. Good prognosis for reaching goals below. Skilled therapy is necessary in order to monitor CV response to exercise, to provide education on risk factors and behavior change counseling needed to achieve patient's goals.  Plan to progress to 30-40 minutes of exercise prior to discharge from cardiac rehab.  Initial THR of 20-30  beats above RHR; Effort rating of 4-6. Initiate muscle conditioning as appropriate. Provide risk factor education and behavior change counseling.      General Information   Treatment Diagnosis NSTEMI   Date of Treatment Diagnosis 07/01/21   Secondary Treatment Diagnosis Stent   Significant Past CV History None   Comorbidities None   Other Medical History Celiac's Disease, osteoporosis   Lead up symptoms Patient states she had had abnormal EKG and stress test, then presented to the ED because she did not feel well.    Hospital Location [facility] Discharge Date 07/07/21   Signs and Symptoms Post Hospital Discharge Anxiety;SOB   Comments Patient states it is hard to describe her symptoms as she does not get typical chest pain. .   Outpatient Cardiac Rehab Start Date 07/16/21   Primary Physician Destin   Specialist Yasemin   Specialist Follow Up Scheduled   Cardiologist Yasemin   Cardiologist Follow Up Scheduled   Ejection Fraction normal   Risk Stratification High   Summary of Cath Report   Summary of Cath Report Available   Date Performed 07/02/21   LAD 80% stenosis of proximal to mid LAD.    D1 80% stenosis of D1   Cath Report Comments Patient recieved GERRY X 1 to LAD.    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 No   Have you fallen and had an injury in the past year? No   Timed Up and Go score (seconds) NA   Is patient a fall risk? No   Fall screen comments Patient slipped on the ice last winter.    Abuse Screen (yes response referral indicated)   Feels Unsafe at Home or Work/School no   Feels Threatened by  "Someone no   Does Anyone Try to Keep You From Having Contact with Others or Doing Things Outside Your Home? no   Physical Signs of Abuse Present no   Pain   Patient Currently in Pain No   Physical Assessments   Incisions WNL   Edema None   Right Lung Sounds normal   Left Lung Sounds normal   Limitations No limitations   Comments Patient is anxious. She hoping to feel more comfortable with her exercise abilities.    Individualized Treatment Plan   Monitored Sessions Scheduled 25   Monitored Sessions Attended 1   Oxygen   Supplemental Oxygen needed No   Nutrition Management - Weight Management   Assessment Initial Assessment   Age 76   Weight 63 kg (139 lb)   Height 1.626 m (5' 4\")   BMI (Calculated) 23.86   Initial Rate Your Plate Score. Dietary tool to assess eating patterns. Scores range from 24 to 72. The higher the score the healthier the eating pattern. 65   Weight Management Comments Patient is satisfied with her current weight.    Nutrition Management - Lipids   Lipids Labs Available   Date 07/01/21   Total Cholesterol 209   Triglycerides 83   HDL 61      Prescribed Lipid Medication Yes   Statin Intensity High Intensity   Lipid Comments Patient states she has been compliant with new medications.    Nutrition Management - Diabetes   Diabetes No   Nutrition Management Summary   Dietary Recommendations Low Fat;Low Cholesterol;Low Sodium;Other (see comments)  (Gluten free)   Stages of Change for Diet Compliance Action   Interventions Planned Other (see comments)  (Patient will be given )   Nutrition Summary Comments Patient has Celiac's and avoids gluten, she has added a heart healty diet to her previous diet.    Nutrition Target Outcome Total Chol < 150, HDL > 40 (M), HDL > 50 (W), LDL < 70, Trig < 150   Psychosocial Management   Psychosocial Assessment Initial   Is there history of clinical depression or increased risk of depression? History of clinical depression   Current Level of Stress per Patient " Report Moderate    Initial Patient Health Questionnaire -9 Score (PHQ-9) for depression. 5-9 Minimal symptoms, 10-14 Minor depression, 15-19 Major depression, moderately severe, > 20 Major depression, severe  7   Initial [facility] Survey score.  Quality of Life:   If total score > 25 review individual areas where patient rated a 4 or 5.  Consider patients current medical condition and what role that plays on the score.   Adjust treatment protocol to improve areas of concern.  Consider the following:  PHQ9 score, DASI, and re-assessment within the next 30 days to assist with developing treatments.  24   Interventions Planned Provide resources for stress relaxation   Psychosocial Comments Patient has a liam chi video she would like to use to help with her stress level.    Psychosocial Target Outcome Maximize coping skills   Other Core Components - Hypertension   History of or Diagnosis of Hypertension Yes   Currently taking Anti-Hypertensives Yes;Beta blocker;Ace Inhibitor;Nitrates   Hypertension Comments Patient is taking medications as prescribed. She has been monitoring her BP at home, and states she has been running low without symptoms.    Other Core Components - Tobacco   History of Tobacco Use Yes   Tobacco Use Status Former (Quit > 6 mo ago)   Tobacco Habit Cigarettes   Years of Tobacco Use 4   Stages of Change Maintenance   Tobacco Comments Patient smoked very little for 4 years, 50 years ago.    Other Core Components Summary   Interventions Planned Other (see comments)  (Patient to be given information regarding blood pressure. )   Other Core Components Comments Patient does not have other core component needs at this time. Staff will continue to reassess the need for intervention.    Other Core Components Target Outcome Compliance with Diet, Medications and Symptom Management to allow for stable Heart Failure   Activity/Exercise History   Activity/Exercise Assessment Initial   Activity/Exercise Status  prior to event? Was Physically Active   Number of Days Currently participating in Moderate Physical Activity? 0   Number of Days Currently performing  Aerobic Exercise (including rehab)? 0   Number of Minutes per Session Currently of Aerobic Exercise (average)? 0   Current Stage of Change (Physical Activity) Preparation   Current Stage of Change (Aerobic Exercise) Contemplation   Patient Goals Goal #1   Goal #1 Description Patient will be able to exercise at 3 METs or greater without anxiety about her heart disease.    Goal #1 Target Date 09/16/21   Goal #1 Progress Towards Goal Patient has initiated Cardiac Rehab to CV response to exercise monitored to help ease her mind.    Activity/Exercise Comments Patient has been somewhat leery to do much for exercise and is hopeful having monitored exercise will ease her mind.    Activity/Exercise Target Outcome An Accumulation of 150  Minutes of Aerobic Activity per Week   Exercise Assessment   6 Minute Walk Predicted - Gender Selection Female   6 Minute Walk Predicted (Male) 1423.12   6 Minute Walk Predicted (Female) 1416.35   Initial 6 Minute Walk Distance (Feet) 1225 ft   Resting HR 52 bpm   Exercise HR 80 bpm   Post Exercise HR 54 bpm   Resting /75   Exercise /76   Post Exercise /76   Pre SpO2 98   While Exercising SpO2 93   Post SpO2 99   Effort Rating 2   Current MET Level 2.8   MET Level Goal 4   ECG Rhythm Sinus bradycardia;Other (Comment)  (inverted t-waves)   Ectopy PVCs   Current Symptoms Dyspnea   Limitations/Restrictions None   Exercise Prescription   Mode Treadmill;Nustep;Recumbant bike;Arm Ergometer   Duration/Time 15-30 min   Frequency 2 days/week   THR (85% of age predicted max HR) 123.25   OMNI Effort Rating (0-10 Scale) 4-6/10   Progression Intermittent bouts;Aerobic exercise to OMNI rating of 6 or below and at or below THR;Progress peak intensity by 1/4 MET per week   Comments Patient to start exercise session on 7/21/21   Recommended  Home Exercise   Type of Exercise Treadmill   Frequency (days per week) 3-4   Duration (minutes per session) 15-30 min   Effort Rating Recommended 4-6/10   30 Day Exercise Plan Patient has a treadmill at home, she will be encouraged to use more frequently.    Current Home Exercise   Type of Exercise Treadmill   Follow-up/On-going Support   Provider follow-up needed on the following No follow-up needed   Learning Assessment   Learner Patient   Primary Language English   Preferred Learning Style Demonstration   Barriers to Learning No barriers noted   Patient Education   Title of Program Cardiac Rehab Education   Education recommended Anatomy and Physiology of the Heart;Blood Pressure;Exercise Principles;Medication Overview;Muscle Conditioning;Nutrition;Risk Factors;Stress Management   Education Comments Patient will be given educational materials for pertinent topics at this time.    Physician cosignature/electronic signature indicates approval of this ITP document. I have established, reviewed and made necessary changes to the individualized treatment plan and exercise prescription for this patient.

## 2021-07-21 ENCOUNTER — HOSPITAL ENCOUNTER (OUTPATIENT)
Dept: CARDIAC REHAB | Facility: HOSPITAL | Age: 76
Setting detail: THERAPIES SERIES
End: 2021-07-21
Attending: INTERNAL MEDICINE
Payer: COMMERCIAL

## 2021-07-21 PROCEDURE — 999N000109 HC STATISTIC OP CR VISIT

## 2021-07-21 PROCEDURE — 93798 PHYS/QHP OP CAR RHAB W/ECG: CPT

## 2021-07-23 ENCOUNTER — HOSPITAL ENCOUNTER (OUTPATIENT)
Dept: CARDIAC REHAB | Facility: HOSPITAL | Age: 76
Setting detail: THERAPIES SERIES
End: 2021-07-23
Attending: INTERNAL MEDICINE
Payer: COMMERCIAL

## 2021-07-23 PROCEDURE — 93798 PHYS/QHP OP CAR RHAB W/ECG: CPT

## 2021-07-23 PROCEDURE — 999N000109 HC STATISTIC OP CR VISIT

## 2021-07-28 ENCOUNTER — HOSPITAL ENCOUNTER (OUTPATIENT)
Dept: CARDIAC REHAB | Facility: HOSPITAL | Age: 76
Setting detail: THERAPIES SERIES
End: 2021-07-28
Attending: INTERNAL MEDICINE
Payer: COMMERCIAL

## 2021-07-28 PROCEDURE — 93798 PHYS/QHP OP CAR RHAB W/ECG: CPT

## 2021-07-28 PROCEDURE — 999N000109 HC STATISTIC OP CR VISIT

## 2021-07-30 ENCOUNTER — HOSPITAL ENCOUNTER (OUTPATIENT)
Dept: CARDIAC REHAB | Facility: HOSPITAL | Age: 76
Setting detail: THERAPIES SERIES
End: 2021-07-30
Attending: INTERNAL MEDICINE
Payer: COMMERCIAL

## 2021-07-30 PROCEDURE — 999N000109 HC STATISTIC OP CR VISIT

## 2021-07-30 PROCEDURE — 93798 PHYS/QHP OP CAR RHAB W/ECG: CPT

## 2021-08-03 ENCOUNTER — DOCUMENTATION ONLY (OUTPATIENT)
Dept: OTHER | Facility: CLINIC | Age: 76
End: 2021-08-03

## 2021-08-04 ENCOUNTER — HOSPITAL ENCOUNTER (OUTPATIENT)
Dept: CARDIAC REHAB | Facility: HOSPITAL | Age: 76
Setting detail: THERAPIES SERIES
End: 2021-08-04
Attending: INTERNAL MEDICINE
Payer: COMMERCIAL

## 2021-08-04 PROCEDURE — 999N000109 HC STATISTIC OP CR VISIT

## 2021-08-04 PROCEDURE — 93798 PHYS/QHP OP CAR RHAB W/ECG: CPT

## 2021-08-06 ENCOUNTER — HOSPITAL ENCOUNTER (OUTPATIENT)
Dept: CARDIAC REHAB | Facility: HOSPITAL | Age: 76
Setting detail: THERAPIES SERIES
End: 2021-08-06
Attending: INTERNAL MEDICINE
Payer: COMMERCIAL

## 2021-08-06 PROCEDURE — 999N000109 HC STATISTIC OP CR VISIT

## 2021-08-06 PROCEDURE — 93798 PHYS/QHP OP CAR RHAB W/ECG: CPT

## 2021-08-10 VITALS — WEIGHT: 140 LBS | BODY MASS INDEX: 23.9 KG/M2 | HEIGHT: 64 IN

## 2021-08-10 ASSESSMENT — 6 MINUTE WALK TEST (6MWT)
GENDER SELECTION: FEMALE
MALE CALC: 1420.5
PREDICTED: 1429.16
TOTAL DISTANCE WALKED (FT): 1225
FEMALE CALC: 1412.94

## 2021-08-10 ASSESSMENT — MIFFLIN-ST. JEOR: SCORE: 1115.04

## 2021-08-10 NOTE — PROGRESS NOTES
"   08/10/21 1300   Session   Session 30 Day Individualized Treatment Plan   Certified through this date 09/08/21   Cardiac Rehab Assessment   Cardiac Rehab Assessment 8/10: Pt completes 7 sessions of Phase II rehab since their cardiac event on July 5th, 2021. Since starting cardiac rehab, staff notes a markedly improvement of pt's anxiety level. Pt seems much more relaxed and comfortable exercising under the guise of telemetry observation and is candid with staff about their concerns. Pt is progressing well and achieves 46 minutes of continuous aerobic exercise since starting their exercise regimen only a mere 3 weeks ago. Staff will continue to monitor hemodynamic response, especially blood pressure and EKG changes, while progressing pt in exercise time and tolerance on aerobic machines.     Since starting rehab, pt has been provided educational material based on their cardiac risk factors and additional information will be in provided in the weeks to come. Currently, pt is attending rehab sessions 2 d/week and is has been encouraged to start HEP of walking on their personal treadmill on days not in rehab. Staff will reassess and address.     General Information   Treatment Diagnosis NSTEMI   Date of Treatment Diagnosis 07/01/21   Secondary Treatment Diagnosis Stent   Significant Past CV History None   Comorbidities None   Other Medical History Celiac's Disease, osteoporosis   Lead up symptoms Patient states she had had abnormal EKG and stress test, then presented to the ED because she did not feel well.    Hospital Location [facility] Discharge Date 07/07/21   Signs and Symptoms Post Hospital Discharge Anxiety;SOB   Outpatient Cardiac Rehab Start Date 07/16/21   Primary Physician Destin   Specialist aYsemin   Specialist Follow Up Scheduled   Cardiologist Yasemin   Cardiologist Follow Up Scheduled   Ejection Fraction   (8/10: \"Normal\")   Risk Stratification High   Summary of Cath Report   Summary " "of Cath Report Available   Date Performed 07/02/21   LAD 80% stenosis of proximal to mid LAD.    D1 80% stenosis of D1   Cath Report Comments Patient recieved GERRY X 1 to LAD.    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 No   Have you fallen and had an injury in the past year? No   Timed Up and Go score (seconds) NA   Is patient a fall risk? No   Fall screen comments Patient slipped on the ice last winter.    Abuse Screen (yes response referral indicated)   Feels Unsafe at Home or Work/School no   Feels Threatened by Someone no   Does Anyone Try to Keep You From Having Contact with Others or Doing Things Outside Your Home? no   Physical Signs of Abuse Present no   Pain   Patient Currently in Pain No   Physical Assessments   Incisions WNL   Edema None   Right Lung Sounds not assessed   Left Lung Sounds not assessed   Limitations No limitations   Comments 8/10: Pt's anxieties have decreased slighlty since attending Phase II rehab but staff will continue to monitor and assess pt's needs in the coming weeks.    Individualized Treatment Plan   Monitored Sessions Scheduled 25   Monitored Sessions Attended 7   Oxygen   Supplemental Oxygen needed No   Nutrition Management - Weight Management   Assessment Re-assessment   Age 76   Weight 63.5 kg (140 lb)   Height 1.626 m (5' 4\")   BMI (Calculated) 24.03   Initial Rate Your Plate Score. Dietary tool to assess eating patterns. Scores range from 24 to 72. The higher the score the healthier the eating pattern. 65   Weight Management Comments 8/10: Pt denies the need to lose weight as they are " content with the current scale readings.    Nutrition Management - Lipids   Lipids Labs Available   Date 07/01/21   Total Cholesterol 209   Triglycerides 83   HDL 61      Prescribed Lipid Medication Yes   Statin Intensity High Intensity   Lipid Comments 8/10: Patient states she has been compliant with new medications.    Nutrition Management - Diabetes   Diabetes No   Nutrition Management Summary   Dietary Recommendations Low Fat;Low Cholesterol;Low Sodium;Other (see comments)  (Gluten free)   Stages of Change for Diet Compliance Action   Interventions Planned Other (see comments)  (Patient will be given )   Nutrition Summary Comments 8/10: Pt has incorporated a heart healthy diet into their established Celiac diet. Staff will provide educaitonal handouts as needed.   Nutrition Target Outcome Total Chol < 150, HDL > 40 (M), HDL > 50 (W), LDL < 70, Trig < 150   Psychosocial Management   Psychosocial Assessment Re-assessment   Is there history of clinical depression or increased risk of depression? History of clinical depression   Current Level of Stress per Patient Report Moderate    Initial Patient Health Questionnaire -9 Score (PHQ-9) for depression. 5-9 Minimal symptoms, 10-14 Minor depression, 15-19 Major depression, moderately severe, > 20 Major depression, severe  7   Initial [facility] Survey score.  Quality of Life:   If total score > 25 review individual areas where patient rated a 4 or 5.  Consider patients current medical condition and what role that plays on the score.   Adjust treatment protocol to improve areas of concern.  Consider the following:  PHQ9 score, DASI, and re-assessment within the next 30 days to assist with developing treatments.  24   Stages of Change Preparation   Interventions Planned Provide resources for stress relaxation   Patient Goal No   Psychosocial Comments 8/10: Staff will discuss with pt at their next session if they began to utilize the liam chi video for stress  relief.    Psychosocial Target Outcome Maximize coping skills   Other Core Components - Hypertension   History of or Diagnosis of Hypertension Yes   Currently taking Anti-Hypertensives Yes;Beta blocker;Ace Inhibitor;Nitrates   Hypertension Comments 8/10: Pt adheres to medications as prescribed and staff continues to monitor blood pressure values. Occasionally pt will note low blood pressure values as home.    Other Core Components - Tobacco   History of Tobacco Use Yes   Tobacco Use Status Former (Quit > 6 mo ago)   Tobacco Habit Cigarettes   Years of Tobacco Use 4   Stages of Change Maintenance   Tobacco Comments 8/10: Pt has hisotry of smoking over a 4 year period over 50 years ago. Pt states they were a very infrequent smoker.    Other Core Components Summary   Interventions Planned Attend education class on Blood Pressure  (Provide education on blood pressure )   Patient Goals No   Other Core Components Comments Patient does not have other core component needs at this time. Staff will continue to reassess the need for intervention.    Other Core Components Target Outcome Compliance with Diet, Medications and Symptom Management to allow for stable Heart Failure   Activity/Exercise History   Activity/Exercise Assessment Re-assessment   Activity/Exercise Status prior to event? Was Physically Active   Number of Days Currently participating in Moderate Physical Activity? 2   Number of Days Currently performing  Aerobic Exercise (including rehab)? 2   Number of Minutes per Session Currently of Aerobic Exercise (average)? 46   Current Stage of Change (Physical Activity) Preparation   Current Stage of Change (Aerobic Exercise) Preparation   Patient Goals Goal #1   Goal #1 Description Patient will be able to exercise at 3 METs or greater without anxiety about her heart disease.    Goal #1 Target Date 09/16/21   Goal #1 Progress Towards Goal 8/10: Pt has exceeded their personal goal of exercising at a MET goal of 3.0 and  denies symptoms. Staff will address if pt is able to tolerate exercise without anxiety with heart disease diagnosis.    Activity/Exercise Comments 8/10: Pt has been encouraged to start HEP as they feel ready following heart event. Staff will monitor their exercise activity.   Activity/Exercise Target Outcome An Accumulation of 150  Minutes of Aerobic Activity per Week   Exercise Assessment   6 Minute Walk Predicted - Gender Selection Female   6 Minute Walk Predicted (Male) 1420.5   6 Minute Walk Predicted (Female) 1412.94   Initial 6 Minute Walk Distance (Feet) 1225 ft   Resting HR 55 bpm   Exercise HR 99 bpm   Post Exercise HR 67 bpm   Resting /76   Exercise /74   Post Exercise /64   Effort Rating 5   Current MET Level 3.5   MET Level Goal 4   ECG Rhythm Sinus bradycardia;Other (Comment)  (Inverted T-waves)   Ectopy PVCs   Current Symptoms Denies symptoms   Limitations/Restrictions None   Exercise Prescription   Mode Treadmill;Nustep;Recumbant bike;Arm Ergometer   Duration/Time 15-30 min   Frequency 2 days/week   THR (85% of age predicted max HR) 123.25   OMNI Effort Rating (0-10 Scale) 4-6/10   Progression Continuous bouts;Total exercise time of 20-30 minutes   Comments 8/10: Pt has attended 7 sessions of aerobic exercise sessions and will continue to attend rehab in the coming weeks. Overall, pt is progressing well and is not in need of additional medical interventions.    Recommended Home Exercise   Type of Exercise Treadmill   Frequency (days per week) 3-4   Duration (minutes per session) 15-30 min   Effort Rating Recommended 4-6/10   30 Day Exercise Plan 8/10: Staff will assess if pt has started HEP of walking on the TM.    Current Home Exercise   Type of Exercise Treadmill   Frequency (days per week) 0   Duration (minutes per session) 0   Follow-up/On-going Support   Provider follow-up needed on the following No follow-up needed   Learning Assessment   Learner Patient   Primary Language  English   Preferred Learning Style Demonstration   Barriers to Learning No barriers noted   Patient Education   Title of Program Cardiac Rehab Education   Education recommended Anatomy and Physiology of the Heart;Blood Pressure;Exercise Principles;Medication Overview;Muscle Conditioning;Nutrition;Risk Factors;Stress Management   Education classes attended Nutrition   Education Comments 8/10: Patient will be given educational materials for pertinent topics at this time.    Physician cosignature/electronic signature indicates approval of this ITP document. I have established, reviewed and made necessary changes to the individualized treatment plan and exercise prescription for this patient.

## 2021-08-11 ENCOUNTER — HOSPITAL ENCOUNTER (OUTPATIENT)
Dept: CARDIAC REHAB | Facility: HOSPITAL | Age: 76
Setting detail: THERAPIES SERIES
End: 2021-08-11
Attending: INTERNAL MEDICINE
Payer: COMMERCIAL

## 2021-08-11 PROCEDURE — 999N000109 HC STATISTIC OP CR VISIT

## 2021-08-11 PROCEDURE — 93798 PHYS/QHP OP CAR RHAB W/ECG: CPT

## 2021-08-13 ENCOUNTER — APPOINTMENT (OUTPATIENT)
Dept: GENERAL RADIOLOGY | Facility: HOSPITAL | Age: 76
End: 2021-08-13
Attending: PHYSICIAN ASSISTANT
Payer: COMMERCIAL

## 2021-08-13 ENCOUNTER — HOSPITAL ENCOUNTER (EMERGENCY)
Facility: HOSPITAL | Age: 76
Discharge: HOME | End: 2021-08-13
Attending: PHYSICIAN ASSISTANT | Admitting: PHYSICIAN ASSISTANT
Payer: COMMERCIAL

## 2021-08-13 VITALS
OXYGEN SATURATION: 98 % | HEART RATE: 58 BPM | RESPIRATION RATE: 16 BRPM | DIASTOLIC BLOOD PRESSURE: 81 MMHG | SYSTOLIC BLOOD PRESSURE: 140 MMHG | WEIGHT: 136 LBS | TEMPERATURE: 99.3 F | BODY MASS INDEX: 23.34 KG/M2

## 2021-08-13 DIAGNOSIS — R07.89 SENSATION OF CHEST PRESSURE: ICD-10-CM

## 2021-08-13 LAB
ANION GAP SERPL CALCULATED.3IONS-SCNC: 4 MMOL/L (ref 3–14)
BASOPHILS # BLD AUTO: 0.1 10E3/UL (ref 0–0.2)
BASOPHILS NFR BLD AUTO: 2 %
BUN SERPL-MCNC: 13 MG/DL (ref 7–30)
CALCIUM SERPL-MCNC: 9.1 MG/DL (ref 8.5–10.1)
CHLORIDE BLD-SCNC: 102 MMOL/L (ref 94–109)
CO2 SERPL-SCNC: 28 MMOL/L (ref 20–32)
CREAT SERPL-MCNC: 0.83 MG/DL (ref 0.52–1.04)
EOSINOPHIL # BLD AUTO: 0.2 10E3/UL (ref 0–0.7)
EOSINOPHIL NFR BLD AUTO: 3 %
ERYTHROCYTE [DISTWIDTH] IN BLOOD BY AUTOMATED COUNT: 12.7 % (ref 10–15)
GFR SERPL CREATININE-BSD FRML MDRD: 69 ML/MIN/1.73M2
GLUCOSE BLD-MCNC: 115 MG/DL (ref 70–99)
HCT VFR BLD AUTO: 40.4 % (ref 35–47)
HGB BLD-MCNC: 13.7 G/DL (ref 11.7–15.7)
HOLD SPECIMEN: NORMAL
IMM GRANULOCYTES # BLD: 0 10E3/UL
IMM GRANULOCYTES NFR BLD: 1 %
LYMPHOCYTES # BLD AUTO: 0.8 10E3/UL (ref 0.8–5.3)
LYMPHOCYTES NFR BLD AUTO: 12 %
MCH RBC QN AUTO: 31.4 PG (ref 26.5–33)
MCHC RBC AUTO-ENTMCNC: 33.9 G/DL (ref 31.5–36.5)
MCV RBC AUTO: 92 FL (ref 78–100)
MONOCYTES # BLD AUTO: 0.6 10E3/UL (ref 0–1.3)
MONOCYTES NFR BLD AUTO: 9 %
NEUTROPHILS # BLD AUTO: 4.9 10E3/UL (ref 1.6–8.3)
NEUTROPHILS NFR BLD AUTO: 73 %
NRBC # BLD AUTO: 0 10E3/UL
NRBC BLD AUTO-RTO: 0 /100
PLATELET # BLD AUTO: 266 10E3/UL (ref 150–450)
POTASSIUM BLD-SCNC: 4 MMOL/L (ref 3.4–5.3)
RBC # BLD AUTO: 4.37 10E6/UL (ref 3.8–5.2)
SODIUM SERPL-SCNC: 134 MMOL/L (ref 133–144)
TROPONIN I SERPL-MCNC: <0.015 UG/L (ref 0–0.04)
WBC # BLD AUTO: 6.6 10E3/UL (ref 4–11)

## 2021-08-13 PROCEDURE — 93010 ELECTROCARDIOGRAM REPORT: CPT | Performed by: INTERNAL MEDICINE

## 2021-08-13 PROCEDURE — 84484 ASSAY OF TROPONIN QUANT: CPT | Performed by: PHYSICIAN ASSISTANT

## 2021-08-13 PROCEDURE — 85025 COMPLETE CBC W/AUTO DIFF WBC: CPT | Performed by: PHYSICIAN ASSISTANT

## 2021-08-13 PROCEDURE — 93005 ELECTROCARDIOGRAM TRACING: CPT

## 2021-08-13 PROCEDURE — 71045 X-RAY EXAM CHEST 1 VIEW: CPT

## 2021-08-13 PROCEDURE — 99284 EMERGENCY DEPT VISIT MOD MDM: CPT | Performed by: PHYSICIAN ASSISTANT

## 2021-08-13 PROCEDURE — 99285 EMERGENCY DEPT VISIT HI MDM: CPT | Mod: 25

## 2021-08-13 PROCEDURE — 36415 COLL VENOUS BLD VENIPUNCTURE: CPT | Performed by: PHYSICIAN ASSISTANT

## 2021-08-13 PROCEDURE — 80048 BASIC METABOLIC PNL TOTAL CA: CPT | Performed by: PHYSICIAN ASSISTANT

## 2021-08-13 ASSESSMENT — ENCOUNTER SYMPTOMS
NECK PAIN: 0
WEAKNESS: 0
CHEST TIGHTNESS: 1
VOMITING: 0
ABDOMINAL PAIN: 0
NAUSEA: 0
FATIGUE: 0
APPETITE CHANGE: 0
CHILLS: 0
BRUISES/BLEEDS EASILY: 1
NECK STIFFNESS: 0
COUGH: 0
BACK PAIN: 0
DIZZINESS: 0
ACTIVITY CHANGE: 0
SHORTNESS OF BREATH: 0
FEVER: 0
PALPITATIONS: 1

## 2021-08-13 NOTE — ED NOTES
Pt and daughter educated on returning with any return of odd cardiac symptoms including neck pressure/ pain. Pt will follow up with PCP and cardiology.

## 2021-08-13 NOTE — DISCHARGE INSTRUCTIONS
I am glad your symptoms have resolved.  As we discussed, the EKG seems to be unchanged from July.  Your troponin level was undetectable at this time.  As we discussed, we would typically have you stay for a recheck in 3 hours.  However we have elected to discharge home at this time.  This is reasonable, however, you need to return here for any return of symptoms, new symptoms, or other questions or concerns whatsoever.  Rest and stay hydrated.  Continue your usual medications.

## 2021-08-13 NOTE — ED NOTES
Care Transitions focused note:      Met with patient and dtr.  Assisted with MyChart set up.    No further issues.    DONALD Arnold

## 2021-08-13 NOTE — ED PROVIDER NOTES
History     Chief Complaint   Patient presents with     Chest Pain     Fatigue     Nausea     The history is provided by the patient.     Lorraine Arredondo is a 76 year old female who presented to the emergency department along with family member for evaluation of a history of some anterior neck discomfort and possible anterior chest pressure upon waking at approximately 615 this morning.  She took 1 nitroglycerin that resolved symptoms.  However the patient reports that she developed some rather significant hypotension from the nitroglycerin.  Reports being asymptomatic since that time.  Significant past medical history of non-STEMI the beginning of July.  She was reseen in the emergency department a few days after sent down to [place] for another possible non-STEMI and had a repeat heart cath that showed no appreciable obstructive process.  She is continuing her aspirin as well as Plavix.  No other concerning symptoms.  No radiation of the pain.  No dyspnea during the pain.  No diaphoresis during the pain.    Allergies:  Allergies   Allergen Reactions     Cats Cough     Sneezing     Fosamax [Alendronic Acid] GI Disturbance     Gluten Meal Diarrhea and GI Disturbance     Lactose GI Disturbance     Nickel Itching     No Clinical Screening - See Comments GI Disturbance     Barley, oats     Wheat Extract GI Disturbance       Problem List:    There are no problems to display for this patient.       Past Medical History:    No past medical history on file.    Past Surgical History:    No past surgical history on file.    Family History:    No family history on file.    Social History:  Marital Status:   [2]  Social History     Tobacco Use     Smoking status: Not on file   Substance Use Topics     Alcohol use: Not on file     Drug use: Not on file        Medications:    aspirin (ASA) 81 MG EC tablet  atorvastatin (LIPITOR) 40 MG tablet  clopidogrel (PLAVIX) 75 MG tablet  clopidogrel (PLAVIX) 75 MG tablet  FP  GLUCOSAMINE SULFATE OR  metoprolol tartrate (LOPRESSOR) 25 MG tablet  nitroGLYcerin (NITROSTAT) 0.4 MG sublingual tablet          Review of Systems   Constitutional: Negative for activity change, appetite change, chills, fatigue and fever.   Respiratory: Positive for chest tightness. Negative for cough and shortness of breath.         No symptoms at this time.   Cardiovascular: Positive for chest pain and palpitations. Negative for leg swelling.        Denies symptoms at this time.  She does have intermittent palpitations from known PVCs.   Gastrointestinal: Negative for abdominal pain, nausea and vomiting.   Genitourinary: Negative.    Musculoskeletal: Negative for back pain, neck pain and neck stiffness.   Skin: Negative.    Allergic/Immunologic: Positive for immunocompromised state.   Neurological: Negative for dizziness and weakness.   Hematological: Bruises/bleeds easily.        Plavix and aspirin       Physical Exam   BP: 141/85  Pulse: 62  Temp: 99.3  F (37.4  C)  Resp: 18  Weight: 61.7 kg (136 lb)  SpO2: 97 %      Physical Exam  Vitals and nursing note reviewed.   Constitutional:       General: She is not in acute distress.     Appearance: Normal appearance. She is normal weight. She is not ill-appearing, toxic-appearing or diaphoretic.      Comments: Very pleasant and talkative 76-year-old female found semireclined on the exam bed in no distress.  She appears younger than her stated age.   Cardiovascular:      Rate and Rhythm: Regular rhythm.      Pulses: Normal pulses.      Comments: Mild bradycardia  Pulmonary:      Effort: Pulmonary effort is normal.      Breath sounds: Normal breath sounds.   Skin:     General: Skin is warm and dry.      Capillary Refill: Capillary refill takes less than 2 seconds.   Neurological:      General: No focal deficit present.      Mental Status: She is alert and oriented to person, place, and time.         ED Course        Procedures         EKG shows sinus bradycardia at a  rate of 59.  There is occasional PVCs that appear to be unifocal.  Left axis deviation of -31 degrees.  HI interval is normal.  QRS duration is normal.  QTc is normal.  P wave duration is normal.  There is T wave inversion in the anterolateral leads that appears unchanged from July.    Critical Care time:  none               Results for orders placed or performed during the hospital encounter of 08/13/21 (from the past 24 hour(s))   CBC with platelets differential    Narrative    The following orders were created for panel order CBC with platelets differential.  Procedure                               Abnormality         Status                     ---------                               -----------         ------                     CBC with platelets and d...[[id number]]                      Final result                 Please view results for these tests on the individual orders.   Basic metabolic panel   Result Value Ref Range    Sodium 134 133 - 144 mmol/L    Potassium 4.0 3.4 - 5.3 mmol/L    Chloride 102 94 - 109 mmol/L    Carbon Dioxide (CO2) 28 20 - 32 mmol/L    Anion Gap 4 3 - 14 mmol/L    Urea Nitrogen 13 7 - 30 mg/dL    Creatinine 0.83 0.52 - 1.04 mg/dL    Calcium 9.1 8.5 - 10.1 mg/dL    Glucose 115 (H) 70 - 99 mg/dL    GFR Estimate 69 >60 mL/min/1.73m2   Troponin I   Result Value Ref Range    Troponin I <0.015 0.000 - 0.045 ug/L   CBC with platelets and differential   Result Value Ref Range    WBC Count 6.6 4.0 - 11.0 10e3/uL    RBC Count 4.37 3.80 - 5.20 10e6/uL    Hemoglobin 13.7 11.7 - 15.7 g/dL    Hematocrit 40.4 35.0 - 47.0 %    MCV 92 78 - 100 fL    MCH 31.4 26.5 - 33.0 pg    MCHC 33.9 31.5 - 36.5 g/dL    RDW 12.7 10.0 - 15.0 %    Platelet Count 266 150 - 450 10e3/uL    % Neutrophils 73 %    % Lymphocytes 12 %    % Monocytes 9 %    % Eosinophils 3 %    % Basophils 2 %    % Immature Granulocytes 1 %    NRBCs per 100 WBC 0 <1 /100    Absolute Neutrophils 4.9 1.6 - 8.3 10e3/uL    Absolute Lymphocytes  0.8 0.8 - 5.3 10e3/uL    Absolute Monocytes 0.6 0.0 - 1.3 10e3/uL    Absolute Eosinophils 0.2 0.0 - 0.7 10e3/uL    Absolute Basophils 0.1 0.0 - 0.2 10e3/uL    Absolute Immature Granulocytes 0.0 <=0.0 10e3/uL    Absolute NRBCs 0.0 10e3/uL   [place] Draw    Narrative    The following orders were created for panel order [place] Draw.  Procedure                               Abnormality         Status                     ---------                               -----------         ------                     Extra Blue Top Tube[[id number]]                              In process                 Extra Red Top Tube[[id number]]                               In process                 Extra Green Top (Lithium...[[id number]]                      In process                   Please view results for these tests on the individual orders.       Medications - No data to display    Assessments & Plan (with Medical Decision Making)   This is a pleasant and talkative well-appearing 76-year-old female who presented to the emergency department approximately 5 hours after experiencing a brief episode of vague neck pressure as well as possible anterior chest pressure.  She does have a significant past medical history of coronary disease with recent stenting in July.  She is undergone 2 heart caths in the last 6 weeks.  EKG today seems unchanged from her previous in July.  Most recent heart catheterization was July 6.  No intervention at that time.  She is currently dual anticoagulated on aspirin and Plavix.  She has been asymptomatic since this morning when she took nitroglycerin.  She had no high risk or red flag features.  Looks well.  Discussed her laboratory evaluation and recheck troponin in 3 hours.  The patient however politely declined rechecking her troponin and requested discharge home.  She does voiced understanding the risks of this decision.  Family also understands the risks.  However, this is reasonable.  Stated above the  patient's episode was vague and brief early this morning.  She has been asymptomatic since that time.  Troponin is undetectable.  She will return here for any new symptoms, worsening symptoms, return of symptoms, or other questions or concerns.    This document was prepared using a combination of typing and voice generated software.  While every attempt was made for accuracy, spelling and grammatical errors may exist.    I have reviewed the nursing notes.    I have reviewed the findings, diagnosis, plan and need for follow up with the patient.       New Prescriptions    No medications on file       Final diagnoses:   Sensation of chest pressure - resolved       8/13/2021   [facility]     Patricia Carrasco PA-C  08/13/21 4013

## 2021-08-13 NOTE — ED TRIAGE NOTES
"\"At 0615 took 1 nitroglycerin SL for having neck pressure which was a partial symptom with a previous heart attack.  I have 1 stent in my heart.  After the nitroglycerin the neck pressure went away.   I am also fatigued and naueated.\"    "

## 2021-08-18 ENCOUNTER — HOSPITAL ENCOUNTER (OUTPATIENT)
Dept: CARDIAC REHAB | Facility: HOSPITAL | Age: 76
Setting detail: THERAPIES SERIES
End: 2021-08-18
Attending: INTERNAL MEDICINE
Payer: COMMERCIAL

## 2021-08-18 PROCEDURE — 93798 PHYS/QHP OP CAR RHAB W/ECG: CPT

## 2021-08-18 PROCEDURE — 999N000109 HC STATISTIC OP CR VISIT

## 2021-08-20 ENCOUNTER — HOSPITAL ENCOUNTER (OUTPATIENT)
Dept: CARDIAC REHAB | Facility: HOSPITAL | Age: 76
Setting detail: THERAPIES SERIES
End: 2021-08-20
Attending: INTERNAL MEDICINE
Payer: COMMERCIAL

## 2021-08-20 PROCEDURE — 999N000109 HC STATISTIC OP CR VISIT

## 2021-08-20 PROCEDURE — 93798 PHYS/QHP OP CAR RHAB W/ECG: CPT

## 2021-08-24 ENCOUNTER — MEDICAL CORRESPONDENCE (OUTPATIENT)
Dept: HEALTH INFORMATION MANAGEMENT | Facility: HOSPITAL | Age: 76
End: 2021-08-24

## 2021-08-25 ENCOUNTER — HOSPITAL ENCOUNTER (OUTPATIENT)
Dept: GENERAL RADIOLOGY | Facility: HOSPITAL | Age: 76
Discharge: HOME | End: 2021-08-25
Attending: FAMILY MEDICINE | Admitting: FAMILY MEDICINE
Payer: COMMERCIAL

## 2021-08-25 ENCOUNTER — HOSPITAL ENCOUNTER (OUTPATIENT)
Dept: CARDIAC REHAB | Facility: HOSPITAL | Age: 76
Setting detail: THERAPIES SERIES
End: 2021-08-25
Attending: INTERNAL MEDICINE
Payer: COMMERCIAL

## 2021-08-25 DIAGNOSIS — R91.8 MASS OF LUNG: ICD-10-CM

## 2021-08-25 PROCEDURE — 71045 X-RAY EXAM CHEST 1 VIEW: CPT

## 2021-08-25 PROCEDURE — 999N000109 HC STATISTIC OP CR VISIT

## 2021-08-25 PROCEDURE — 93798 PHYS/QHP OP CAR RHAB W/ECG: CPT

## 2021-08-27 ENCOUNTER — HOSPITAL ENCOUNTER (OUTPATIENT)
Dept: CARDIAC REHAB | Facility: HOSPITAL | Age: 76
Setting detail: THERAPIES SERIES
End: 2021-08-27
Attending: INTERNAL MEDICINE
Payer: COMMERCIAL

## 2021-08-27 PROCEDURE — 999N000109 HC STATISTIC OP CR VISIT

## 2021-08-27 PROCEDURE — 93798 PHYS/QHP OP CAR RHAB W/ECG: CPT

## 2021-09-01 ENCOUNTER — HOSPITAL ENCOUNTER (OUTPATIENT)
Dept: CARDIAC REHAB | Facility: HOSPITAL | Age: 76
Setting detail: THERAPIES SERIES
End: 2021-09-01
Attending: INTERNAL MEDICINE
Payer: COMMERCIAL

## 2021-09-01 PROCEDURE — 93798 PHYS/QHP OP CAR RHAB W/ECG: CPT

## 2021-09-01 PROCEDURE — 999N000109 HC STATISTIC OP CR VISIT

## 2021-09-03 ENCOUNTER — HOSPITAL ENCOUNTER (OUTPATIENT)
Dept: CARDIAC REHAB | Facility: HOSPITAL | Age: 76
Setting detail: THERAPIES SERIES
End: 2021-09-03
Attending: INTERNAL MEDICINE
Payer: COMMERCIAL

## 2021-09-03 PROCEDURE — 93798 PHYS/QHP OP CAR RHAB W/ECG: CPT

## 2021-09-03 PROCEDURE — 999N000109 HC STATISTIC OP CR VISIT

## 2021-09-07 VITALS — WEIGHT: 141 LBS | HEIGHT: 64 IN | BODY MASS INDEX: 24.07 KG/M2

## 2021-09-07 ASSESSMENT — MIFFLIN-ST. JEOR: SCORE: 1119.82

## 2021-09-07 ASSESSMENT — 6 MINUTE WALK TEST (6MWT)
FEMALE CALC: 1409.81
PREDICTED: 1427.54
MALE CALC: 1418.88
GENDER SELECTION: FEMALE
TOTAL DISTANCE WALKED (FT): 1225

## 2021-09-07 NOTE — PROGRESS NOTES
"   09/07/21 1000   Session   Session 60 Day Individualized Treatment Plan   Certified through this date 10/06/21   Cardiac Rehab Assessment   Cardiac Rehab Assessment 9/7: Pt completes 14 sessions of phase II rehab and has been provided education regarding all about your heart, blood pressure, and nutrition. Pt reaches 5 METs with appropriate hemodynamic response and no concerns. Staff to address changes of current HEP and liam chi video status. Staff will continue to monitor and assess pt during exercise sessions notifying MD of any changes. Skilled services are necessary to monitor CV response and educate on personal risk factors.   General Information   Treatment Diagnosis NSTEMI   Date of Treatment Diagnosis 07/01/21   Secondary Treatment Diagnosis Stent   Significant Past CV History None   Comorbidities None   Other Medical History Celiac's Disease, osteoporosis   Lead up symptoms Patient states she had had abnormal EKG and stress test, then presented to the ED because she did not feel well.    Hospital Location [facility] Discharge Date 07/07/21   Signs and Symptoms Post Hospital Discharge Anxiety   Outpatient Cardiac Rehab Start Date 07/16/21   Primary Physician Destin   Primary Physician Follow Up Completed   Specialist Yasemin   Specialist Follow Up Scheduled   Cardiologist Yasemin   Cardiologist Follow Up Scheduled   Ejection Fraction   (\"normal\")   Risk Stratification High   Summary of Cath Report   Summary of Cath Report Available   Date Performed 07/02/21   LAD 80% stenosis of proximal to mid LAD.    D1 80% stenosis of D1   Cath Report Comments Patient recieved GERRY X 1 to LAD.    Living and Work Status    Living Arrangements and Social Status house;spouse   Support System Live with an adult   Return to Employment Retired   Occupation Radiation Therapy RN    Preventative Medications   CMS recommended medications Antiplatelets;Beta Blocker;Influenza vaccination;Pneumonia vaccination;Lipid " "Lowering   Fall Risk Screen   Fall screen completed by Cardiac Rehab   Have you fallen 2 or more times in the past year? No   Have you fallen and had an injury in the past year? No   Timed Up and Go score (seconds) NA   Is patient a fall risk? No   Fall screen comments 9/7: Patient slipped on the ice last winter which did not lead to an injury. Pt is currently not at risk for falls.   Abuse Screen (yes response referral indicated)   Feels Unsafe at Home or Work/School no   Feels Threatened by Someone no   Does Anyone Try to Keep You From Having Contact with Others or Doing Things Outside Your Home? no   Physical Signs of Abuse Present no   Pain   Patient Currently in Pain No   Physical Assessments   Incisions WNL   Edema None   Right Lung Sounds not assessed   Left Lung Sounds not assessed   Limitations No limitations   Comments : Pt's anxieties have decreased slighlty since attending Phase II rehab but staff will continue to monitor and assess pt's needs in the coming weeks.    Individualized Treatment Plan   Monitored Sessions Scheduled 25   Monitored Sessions Attended 14   Oxygen   Supplemental Oxygen needed No   Nutrition Management - Weight Management   Assessment Re-assessment   Age 76   Weight 64 kg (141 lb)   Height 1.626 m (5' 4.02\")   BMI (Calculated) 24.19   Initial Rate Your Plate Score. Dietary tool to assess eating patterns. Scores range from 24 to 72. The higher the score the healthier the eating pattern. 65   Weight Management Comments 9/7: Pt denies the need to lose weight as they are content with the current scale readings.    Nutrition Management - Lipids   Lipids Labs Available   Date 07/01/21   Total Cholesterol 209   Triglycerides 83   HDL 61      Prescribed Lipid Medication Yes   Statin Intensity High Intensity   Lipid Comments 9/7: Patient states she has been compliant with new medications.    Nutrition Management - Diabetes   Diabetes No   Nutrition Management Summary   Dietary " Recommendations Low Fat;Low Cholesterol;Low Sodium;Other (see comments)  (Gluten free)   Stages of Change for Diet Compliance Action   Interventions Planned Other (see comments);Attend Nutrition Education Class(es)  (Patient will be provided education)   Interventions In Progress or Completed Attended Nutrition Class(es)  (Pt was provided nutrition education.)   Nutrition Summary Comments 9/7: Pt has incorporated a heart healthy diet into their established Celiac diet. Staff will provide educaitonal handouts as needed.   Nutrition Target Outcome Total Chol < 150, HDL > 40 (M), HDL > 50 (W), LDL < 70, Trig < 150   Psychosocial Management   Psychosocial Assessment Re-assessment   Is there history of clinical depression or increased risk of depression? History of clinical depression   Current Level of Stress per Patient Report Moderate    Initial Patient Health Questionnaire -9 Score (PHQ-9) for depression. 5-9 Minimal symptoms, 10-14 Minor depression, 15-19 Major depression, moderately severe, > 20 Major depression, severe  7   Initial [facility] Survey score.  Quality of Life:   If total score > 25 review individual areas where patient rated a 4 or 5.  Consider patients current medical condition and what role that plays on the score.   Adjust treatment protocol to improve areas of concern.  Consider the following:  PHQ9 score, DASI, and re-assessment within the next 30 days to assist with developing treatments.  24   Stages of Change Preparation   Interventions Planned Provide resources for stress relaxation   Patient Goal No   Psychosocial Comments 9/7: Staff will discuss with pt at their next session if they began to utilize the liam chi video for stress relief.    Psychosocial Target Outcome Maximize coping skills   Other Core Components - Hypertension   History of or Diagnosis of Hypertension Yes   Currently taking Anti-Hypertensives Yes;Beta blocker;Ace Inhibitor;Nitrates   Hypertension Comments 9/7: Pt  adheres to medications as prescribed and staff continues to monitor blood pressure values. Occasionally pt will note low blood pressure values as home.    Other Core Components - Tobacco   History of Tobacco Use Yes   Tobacco Use Status Former (Quit > 6 mo ago)   Tobacco Habit Cigarettes   Years of Tobacco Use 4   Stages of Change Maintenance   Tobacco Comments 9/7: Pt has hisotry of smoking over a 4 year period over 50 years ago. Pt states they were a very infrequent smoker.    Other Core Components Summary   Interventions Planned Attend education class on Blood Pressure  (Provide education on blood pressure )   Interventions In Progress or Completed Attended education class on Blood Pressure   Patient Goals No   Other Core Components Comments 9/7: Patient does not have other core component needs at this time. Staff will continue to reassess the need for intervention.    Other Core Components Target Outcome Compliance with Diet, Medications and Symptom Management to allow for stable Heart Failure   Activity/Exercise History   Activity/Exercise Assessment Re-assessment   Activity/Exercise Status prior to event? Was Physically Active   Number of Days Currently participating in Moderate Physical Activity? 2   Number of Days Currently performing  Aerobic Exercise (including rehab)? 2   Number of Minutes per Session Currently of Aerobic Exercise (average)? 47   Current Stage of Change (Physical Activity) Action   Current Stage of Change (Aerobic Exercise) Action   Patient Goals Goal #1   Goal #1 Description Patient will be able to exercise at 3 METs or greater without anxiety about her heart disease.    Goal #1 Target Date 09/16/21   Goal #1 Progress Towards Goal 9/7: Pt has exceeded their personal goal of exercising at a MET goal of 3.0 and denies symptoms. Staff will address if pt is able to tolerate exercise without anxiety with heart disease diagnosis.    Activity/Exercise Comments 9/7: Pt has been encouraged to  start HEP as they feel ready following heart event. Staff will monitor their exercise activity.   Activity/Exercise Target Outcome An Accumulation of 150  Minutes of Aerobic Activity per Week   Exercise Assessment   6 Minute Walk Predicted - Gender Selection Female   6 Minute Walk Predicted (Male) 1418.88   6 Minute Walk Predicted (Female) 1409.81   Initial 6 Minute Walk Distance (Feet) 1225 ft   Resting HR 65 bpm   Exercise HR 93 bpm   Post Exercise HR 65 bpm   Resting /80   Exercise /78   Post Exercise BP 96/68   Effort Rating 4-5   Current MET Level 5   MET Level Goal 4-5   ECG Rhythm Sinus bradycardia;Other (Comment)  (Inverted T-waves)   Ectopy PVCs   Current Symptoms Denies symptoms   Limitations/Restrictions None   Exercise Prescription   Mode Treadmill;Nustep;Recumbant bike;Arm Ergometer;Elliptical;Rowing machine   Duration/Time 30-45 min   Frequency 2 days/week   THR (85% of age predicted max HR) 123.25   OMNI Effort Rating (0-10 Scale) 4-6/10   Progression Continuous bouts;Total exercise time of 30-45 minutes;Aerobic exercise to OMNI rating of 5-7, and heart rate at or below target   Comments 9/7: Pt has attended 14 sessions of aerobic exercise sessions and will continue to attend rehab in the coming weeks. Overall, pt is progressing well and is not in need of additional medical interventions.    Recommended Home Exercise   Type of Exercise Treadmill   Frequency (days per week) 3-4   Duration (minutes per session) 15-30 min   Effort Rating Recommended 4-6/10   30 Day Exercise Plan 9/7: Staff will assess if pt has started HEP of walking on the TM.    Current Home Exercise   Type of Exercise Treadmill   Frequency (days per week) 0   Duration (minutes per session) 0   Follow-up/On-going Support   Provider follow-up needed on the following No follow-up needed   Learning Assessment   Learner Patient   Primary Language English   Preferred Learning Style Demonstration   Barriers to Learning No  barriers noted   Patient Education   Title of Program Cardiac Rehab Education   Education recommended Anatomy and Physiology of the Heart;Blood Pressure;Exercise Principles;Medication Overview;Muscle Conditioning;Nutrition;Risk Factors;Stress Management   Education classes attended Anatomy and Physiology of the Heart;Blood Pressure;Nutrition   Education Comments 9/7: Patient will be given educational materials for pertinent topics at this time.    Physician cosignature/electronic signature indicates approval of this ITP document. I have established, reviewed and made necessary changes to the individualized treatment plan and exercise prescription for this patient.

## 2021-09-08 ENCOUNTER — HOSPITAL ENCOUNTER (OUTPATIENT)
Dept: CARDIAC REHAB | Facility: HOSPITAL | Age: 76
Setting detail: THERAPIES SERIES
End: 2021-09-08
Attending: INTERNAL MEDICINE
Payer: COMMERCIAL

## 2021-09-08 PROCEDURE — 999N000109 HC STATISTIC OP CR VISIT

## 2021-09-08 PROCEDURE — 93798 PHYS/QHP OP CAR RHAB W/ECG: CPT

## 2021-09-10 ENCOUNTER — HOSPITAL ENCOUNTER (OUTPATIENT)
Dept: CARDIAC REHAB | Facility: HOSPITAL | Age: 76
Setting detail: THERAPIES SERIES
End: 2021-09-10
Attending: INTERNAL MEDICINE
Payer: COMMERCIAL

## 2021-09-10 PROCEDURE — 93798 PHYS/QHP OP CAR RHAB W/ECG: CPT

## 2021-09-10 PROCEDURE — 999N000109 HC STATISTIC OP CR VISIT

## 2021-09-15 ENCOUNTER — HOSPITAL ENCOUNTER (OUTPATIENT)
Dept: CARDIAC REHAB | Facility: HOSPITAL | Age: 76
Setting detail: THERAPIES SERIES
End: 2021-09-15
Attending: INTERNAL MEDICINE
Payer: COMMERCIAL

## 2021-09-15 PROCEDURE — 999N000109 HC STATISTIC OP CR VISIT

## 2021-09-15 PROCEDURE — 93798 PHYS/QHP OP CAR RHAB W/ECG: CPT

## 2021-09-22 ENCOUNTER — HOSPITAL ENCOUNTER (OUTPATIENT)
Dept: CARDIAC REHAB | Facility: HOSPITAL | Age: 76
Setting detail: THERAPIES SERIES
End: 2021-09-22
Attending: INTERNAL MEDICINE
Payer: COMMERCIAL

## 2021-09-22 PROCEDURE — 999N000109 HC STATISTIC OP CR VISIT

## 2021-09-22 PROCEDURE — 93798 PHYS/QHP OP CAR RHAB W/ECG: CPT

## 2021-09-24 ENCOUNTER — HOSPITAL ENCOUNTER (OUTPATIENT)
Dept: CARDIAC REHAB | Facility: HOSPITAL | Age: 76
Setting detail: THERAPIES SERIES
End: 2021-09-24
Attending: INTERNAL MEDICINE
Payer: COMMERCIAL

## 2021-09-24 PROCEDURE — 93798 PHYS/QHP OP CAR RHAB W/ECG: CPT

## 2021-09-24 PROCEDURE — 999N000109 HC STATISTIC OP CR VISIT

## 2021-09-29 ENCOUNTER — HOSPITAL ENCOUNTER (OUTPATIENT)
Dept: CARDIAC REHAB | Facility: HOSPITAL | Age: 76
Setting detail: THERAPIES SERIES
End: 2021-09-29
Attending: INTERNAL MEDICINE
Payer: COMMERCIAL

## 2021-09-29 PROCEDURE — 999N000109 HC STATISTIC OP CR VISIT

## 2021-09-29 PROCEDURE — 93798 PHYS/QHP OP CAR RHAB W/ECG: CPT

## 2021-10-01 ENCOUNTER — HOSPITAL ENCOUNTER (OUTPATIENT)
Dept: CARDIAC REHAB | Facility: HOSPITAL | Age: 76
Setting detail: THERAPIES SERIES
End: 2021-10-01
Attending: INTERNAL MEDICINE
Payer: COMMERCIAL

## 2021-10-01 VITALS — WEIGHT: 141.8 LBS | HEIGHT: 64 IN | BODY MASS INDEX: 24.21 KG/M2

## 2021-10-01 PROCEDURE — 93798 PHYS/QHP OP CAR RHAB W/ECG: CPT

## 2021-10-01 PROCEDURE — 999N000109 HC STATISTIC OP CR VISIT

## 2021-10-01 ASSESSMENT — MIFFLIN-ST. JEOR: SCORE: 1123.2

## 2021-10-01 ASSESSMENT — 6 MINUTE WALK TEST (6MWT)
GENDER SELECTION: FEMALE
TOTAL DISTANCE WALKED (FT): 1225
FEMALE CALC: 1406.79
PREDICTED: 1424.41
TOTAL DISTANCE WALKED (FT): 1418
MALE CALC: 1415.78

## 2021-10-01 NOTE — PROGRESS NOTES
"   10/01/21 1200   Session   Session Discharge Note   Certified through this date 10/01/21   Cardiac Rehab Assessment   Cardiac Rehab Assessment 10/1: Patient completes 20 sessions of Phase II Cardiac Rehab. She has done well overall. She does not have anxiety about her heart health like she did initially. She stated she feels like she is ready to be discharged from the program as she is exercising at home and has reached her maximum level of physical ability. Staff agreed with her conclusion.  Pt made significant gains in exercise tolerance. Initially patient tolerated 11 minutes at 2.8 METs, now tolerating 38-47 minutes at 7.1 METs intermittently. Patient also increased 6-minute walk test by 16% (an increase of 193 feet.) The PT was given instructions on frequency, intensity, and duration for continued exercise as well as muscle conditioning and stretching exercises.  Your PT plans to continue walking and start snow shoeing this winter. She will also continue to do her stretching and weights routine.      General Information   Treatment Diagnosis NSTEMI   Date of Treatment Diagnosis 07/01/21   Secondary Treatment Diagnosis Stent   Significant Past CV History None   Comorbidities None   Other Medical History Celiac's Disease, osteoporosis   Lead up symptoms Patient states she had had abnormal EKG and stress test, then presented to the ED because she did not feel well.    Hospital Location [facility] Discharge Date 07/07/21   Signs and Symptoms Post Hospital Discharge Anxiety   Outpatient Cardiac Rehab Start Date 07/16/21   Primary Physician Destin   Primary Physician Follow Up Completed   Specialist Yasemin   Specialist Follow Up Scheduled   Cardiologist Yasemin   Cardiologist Follow Up Scheduled   Ejection Fraction   (\"normal\")   Risk Stratification High   Summary of Cath Report   Summary of Cath Report Available   Date Performed 07/02/21   LAD 80% stenosis of proximal to mid LAD.    D1 80% " "stenosis of D1   Cath Report Comments Patient recieved GERRY X 1 to LAD.    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 No   Have you fallen and had an injury in the past year? No   Timed Up and Go score (seconds) NA   Is patient a fall risk? No   Fall screen comments 10/1: Patient    Abuse Screen (yes response referral indicated)   Feels Unsafe at Home or Work/School no   Feels Threatened by Someone no   Does Anyone Try to Keep You From Having Contact with Others or Doing Things Outside Your Home? no   Physical Signs of Abuse Present no   Pain   Patient Currently in Pain No   Physical Assessments   Incisions WNL   Edema None   Right Lung Sounds not assessed   Left Lung Sounds not assessed   Limitations No limitations   Comments 10/1: Patient states her anxiety has diminished greatly since the start of Cardiac Rehab.    Individualized Treatment Plan   Monitored Sessions Scheduled 25   Monitored Sessions Attended 20   Oxygen   Supplemental Oxygen needed No   Nutrition Management - Weight Management   Assessment Discharge   Age 76   Weight 64.3 kg (141 lb 12.8 oz)   Height 1.626 m (5' 4\")   BMI (Calculated) 24.34   Initial Rate Your Plate Score. Dietary tool to assess eating patterns. Scores range from 24 to 72. The higher the score the healthier the eating pattern. 65   Discharge Rate Your Plate Score 65   Weight Management Comments 10/1: Patient is still comfortable at her current weight.    Nutrition Management - Lipids   Lipids Labs Available   Date 07/01/21   Total Cholesterol 209   Triglycerides 83   HDL 61      Prescribed " Lipid Medication Yes   Statin Intensity High Intensity   Lipid Comments 10/1: Patient remains compliant with medications.    Nutrition Management - Diabetes   Diabetes No   Nutrition Management Summary   Dietary Recommendations Low Fat;Low Cholesterol;Low Sodium;Other (see comments)  (Gluten free)   Stages of Change for Diet Compliance Action   Interventions Planned Other (see comments);Attend Nutrition Education Class(es)  (Patient will be provided education)   Interventions In Progress or Completed Attended Nutrition Class(es)  (Pt was provided nutrition education.)   Nutrition Summary Comments 10/1: Pt has incorporated a heart healthy diet into their established Celiac diet.    Nutrition Target Outcome Total Chol < 150, HDL > 40 (M), HDL > 50 (W), LDL < 70, Trig < 150   Psychosocial Management   Psychosocial Assessment Discharge   Is there history of clinical depression or increased risk of depression? History of clinical depression   Current Level of Stress per Patient Report Moderate    Current Coping Skills Has Positive Support System   Initial Patient Health Questionnaire -9 Score (PHQ-9) for depression. 5-9 Minimal symptoms, 10-14 Minor depression, 15-19 Major depression, moderately severe, > 20 Major depression, severe  7   Discharge PHQ-9 Score for Depression 2   Initial [employer] COOP Survey score.  Quality of Life:   If total score > 25 review individual areas where patient rated a 4 or 5.  Consider patients current medical condition and what role that plays on the score.   Adjust treatment protocol to improve areas of concern.  Consider the following:  PHQ9 score, DASI, and re-assessment within the next 30 days to assist with developing treatments.  24   Discharge [employer] COOP Survey Score 17   Stages of Change Preparation   Interventions Planned Provide resources for stress relaxation   Patient Goal No   Psychosocial Comments 10/1: Patient has resumed her relaxation exercises.    Psychosocial Target  Outcome Maximize coping skills   Other Core Components - Hypertension   History of or Diagnosis of Hypertension Yes   Currently taking Anti-Hypertensives Yes;Beta blocker;Ace Inhibitor;Nitrates   Hypertension Comments 10/1: Patient is continuing to follow prescribed medications.    Other Core Components - Tobacco   History of Tobacco Use Yes   Tobacco Use Status Former (Quit > 6 mo ago)   Tobacco Habit Cigarettes   Years of Tobacco Use 4   Stages of Change Maintenance   Tobacco Comments 10/1: Pt has history of smoking for 4 years; over 50 years ago. Pt states she was a very infrequent smoker.    Other Core Components Summary   Interventions Planned Attend education class on Blood Pressure  (Provide education on blood pressure )   Interventions In Progress or Completed Attended education class on Blood Pressure   Patient Goals No   Other Core Components Comments 10/1: Patient does not have other core component needs at this time. Staff will continue to reassess the need for intervention.    Other Core Components Target Outcome Compliance with Diet, Medications and Symptom Management to allow for stable Heart Failure   Activity/Exercise History   Activity/Exercise Assessment Discharge   Activity/Exercise Status prior to event? Was Physically Active   Number of Days Currently participating in Moderate Physical Activity? 5-7   Number of Days Currently performing  Aerobic Exercise (including rehab)? 5-7   Number of Minutes per Session Currently of Aerobic Exercise (average)? 47   Current Stage of Change (Physical Activity) Action   Current Stage of Change (Aerobic Exercise) Action   Patient Goals Goal #1   Goal #1 Description Patient will be able to exercise at 3 METs or greater without anxiety about her heart disease.    Goal #1 Target Date 10/01/21   Goal #1 Date Met 10/01/21   Goal #1 Progress Towards Goal 10/1: Patient has been able to exercise up to 7.1 METs intermittently. She states she is comfortable exercising  at home and is not anxious while she has been walking at home.    Activity/Exercise Comments 10/1: Patient is walking at home most days, she plans to continue this activity. She will start snowshoeing this winter, and continue with her home stretching routine.    Activity/Exercise Target Outcome An Accumulation of 150  Minutes of Aerobic Activity per Week   Exercise Assessment   6 Minute Walk Predicted - Gender Selection Female   6 Minute Walk Predicted (Male) 1415.78   6 Minute Walk Predicted (Female) 1406.79   Initial 6 Minute Walk Distance (Feet) 1225 ft   Discharge 6 Minute Walk Distance (Feet) 1418   Resting HR 56 bpm   Exercise HR 96 bpm   Post Exercise HR 67 bpm   Resting /70   Exercise /68   Post Exercise /68   Pre SpO2 97   While Exercising SpO2 97   Post SpO2 96   Effort Rating 4-6   Current MET Level 7.1   MET Level Goal 4   ECG Rhythm Sinus bradycardia;Other (Comment)  (Inverted T-waves)   Ectopy PVCs   Current Symptoms Denies symptoms   Limitations/Restrictions None   Exercise Prescription   Mode Treadmill;Nustep;Recumbant bike;Arm Ergometer;Elliptical;Rowing machine   Duration/Time 30-45 min   Frequency 2 days/week   THR (85% of age predicted max HR) 123.25   OMNI Effort Rating (0-10 Scale) 4-6/10   Progression Continuous bouts;Total exercise time of 30-45 minutes;Aerobic exercise to OMNI rating of 5-7, and heart rate at or below target   Comments 10/1: Patient feels she is doing well and asked to be discharged from the program. Staff agreed with the decision.    Recommended Home Exercise   Type of Exercise Walking   Frequency (days per week) 3-4   Duration (minutes per session) 15-30 min   Effort Rating Recommended 4-6/10   30 Day Exercise Plan 10/1: Patient is walking regularly at home.    Current Home Exercise   Type of Exercise Walking   Frequency (days per week) 4-5   Duration (minutes per session) 20-30   Follow-up/On-going Support   Provider follow-up needed on the following No  follow-up needed   Learning Assessment   Learner Patient   Primary Language English   Preferred Learning Style Demonstration   Barriers to Learning No barriers noted   Patient Education   Title of Program Cardiac Rehab Education   Education recommended Anatomy and Physiology of the Heart;Blood Pressure;Exercise Principles;Medication Overview;Muscle Conditioning;Nutrition;Risk Factors;Stress Management   Education classes attended Anatomy and Physiology of the Heart;Blood Pressure;Medication Overview;Nutrition;Risk Factors;Stress Management   Education Comments 10/1: Patient was provided educational materials on a weekly basis.    Physician cosignature/electronic signature indicates agreements with the ITP document and approval of discharge.

## 2021-10-03 ENCOUNTER — HEALTH MAINTENANCE LETTER (OUTPATIENT)
Age: 76
End: 2021-10-03

## 2022-09-04 ENCOUNTER — HEALTH MAINTENANCE LETTER (OUTPATIENT)
Age: 77
End: 2022-09-04

## 2023-01-15 ENCOUNTER — HEALTH MAINTENANCE LETTER (OUTPATIENT)
Age: 78
End: 2023-01-15

## 2023-05-17 ENCOUNTER — MEDICAL CORRESPONDENCE (OUTPATIENT)
Dept: HEALTH INFORMATION MANAGEMENT | Facility: HOSPITAL | Age: 78
End: 2023-05-17

## 2023-05-19 ENCOUNTER — HOSPITAL ENCOUNTER (OUTPATIENT)
Dept: BONE DENSITY | Facility: HOSPITAL | Age: 78
Discharge: HOME | End: 2023-05-19
Attending: FAMILY MEDICINE | Admitting: FAMILY MEDICINE
Payer: COMMERCIAL

## 2023-05-19 DIAGNOSIS — E28.39 ESTROGEN DEFICIENCY: ICD-10-CM

## 2023-05-19 PROCEDURE — 77080 DXA BONE DENSITY AXIAL: CPT

## 2024-07-07 ENCOUNTER — HEALTH MAINTENANCE LETTER (OUTPATIENT)
Age: 79
End: 2024-07-07

## 2024-10-17 ENCOUNTER — TELEPHONE (OUTPATIENT)
Dept: UROLOGY | Facility: OTHER | Age: 79
End: 2024-10-17

## 2024-10-17 NOTE — CONFIDENTIAL NOTE
October 17, 2024    Referral received from [facility]. Left message for patient to return call to schedule with Urology for urinary frequency.    -Macy Mar on 10/17/2024 at 2:07 PM

## 2024-10-30 ENCOUNTER — DOCUMENTATION ONLY (OUTPATIENT)
Dept: UROLOGY | Facility: OTHER | Age: 79
End: 2024-10-30

## 2025-03-30 ENCOUNTER — HEALTH MAINTENANCE LETTER (OUTPATIENT)
Age: 80
End: 2025-03-30

## 2025-05-14 ENCOUNTER — MEDICAL CORRESPONDENCE (OUTPATIENT)
Dept: HEALTH INFORMATION MANAGEMENT | Facility: HOSPITAL | Age: 80
End: 2025-05-14

## 2025-06-04 ENCOUNTER — HOSPITAL ENCOUNTER (OUTPATIENT)
Dept: BONE DENSITY | Facility: HOSPITAL | Age: 80
Discharge: HOME | End: 2025-06-04
Attending: FAMILY MEDICINE
Payer: COMMERCIAL

## 2025-06-04 DIAGNOSIS — M81.0 AGE-RELATED OSTEOPOROSIS WITHOUT CURRENT PATHOLOGICAL FRACTURE: ICD-10-CM

## 2025-06-04 PROCEDURE — 77080 DXA BONE DENSITY AXIAL: CPT

## 2025-06-04 PROCEDURE — 77080 DXA BONE DENSITY AXIAL: CPT | Mod: 26 | Performed by: RADIOLOGY

## 2025-08-13 ENCOUNTER — MEDICAL CORRESPONDENCE (OUTPATIENT)
Dept: ULTRASOUND IMAGING | Facility: HOSPITAL | Age: 80
End: 2025-08-13

## 2025-08-20 ENCOUNTER — HOSPITAL ENCOUNTER (OUTPATIENT)
Dept: CARDIOLOGY | Facility: HOSPITAL | Age: 80
Discharge: HOME | End: 2025-08-20
Attending: FAMILY MEDICINE
Payer: COMMERCIAL

## 2025-08-20 DIAGNOSIS — I95.1 ORTHOSTATIC HYPOTENSION: ICD-10-CM

## 2025-08-20 LAB — LVEF ECHO: NORMAL

## 2025-08-20 PROCEDURE — 93306 TTE W/DOPPLER COMPLETE: CPT
